# Patient Record
Sex: MALE | Race: OTHER | HISPANIC OR LATINO | URBAN - METROPOLITAN AREA
[De-identification: names, ages, dates, MRNs, and addresses within clinical notes are randomized per-mention and may not be internally consistent; named-entity substitution may affect disease eponyms.]

---

## 2017-09-06 ENCOUNTER — EMERGENCY (EMERGENCY)
Facility: HOSPITAL | Age: 42
LOS: 1 days | Discharge: HOME | End: 2017-09-06
Admitting: EMERGENCY MEDICINE
Payer: SELF-PAY

## 2017-09-06 VITALS
HEART RATE: 93 BPM | RESPIRATION RATE: 18 BRPM | TEMPERATURE: 98 F | DIASTOLIC BLOOD PRESSURE: 84 MMHG | SYSTOLIC BLOOD PRESSURE: 136 MMHG | OXYGEN SATURATION: 98 %

## 2017-09-06 PROCEDURE — 71020: CPT | Mod: 26

## 2017-09-06 PROCEDURE — 99284 EMERGENCY DEPT VISIT MOD MDM: CPT

## 2017-09-06 RX ORDER — IBUPROFEN 200 MG
600 TABLET ORAL ONCE
Qty: 0 | Refills: 0 | Status: COMPLETED | OUTPATIENT
Start: 2017-09-06 | End: 2017-09-06

## 2017-09-06 RX ADMIN — Medication 600 MILLIGRAM(S): at 21:28

## 2017-09-06 NOTE — ED PROVIDER NOTE - MEDICAL DECISION MAKING DETAILS
41 y/o M with cough with associated upper chest and back pain; likely bronchitis b/c costochondritis   -will get cxr to r/o PNA

## 2017-09-06 NOTE — ED PROVIDER NOTE - OBJECTIVE STATEMENT
43 y/o M no PMH c/o persistent dry cough x 2 weeks with associated right sided upper back and chest pain worse with cough and movement. Pt states he originally experienced pain only with cough but now experiences constant pain that worsens with cough. Also notes pt originally had rhinorrhea/congestion which has improved. Endorses mild SOB only with cough. Denies fever, chills, hemoptysis, abdominal pain, N/V/D, sore throat, HA, le edema, recent travel/sx, h/o DVT/PE, hormone use.

## 2017-09-06 NOTE — ED PROVIDER NOTE - PLAN OF CARE
Rest, stay hydrated, take all meds as previously prescribed, Followup with your PMD within 2 days for post hospital visit. Show your doctor and specialists all copies of labs given to you. Return for worsening symptoms, ex. fever, shortness of breath, chest pain, dizziness, palpitations, etc. Please read all the patient handouts.  If no PMD, can call [facility] EM/IM clinic or [facility] Medicine clinic [phone] in [facility] for appointment.

## 2017-09-06 NOTE — ED PROVIDER NOTE - CARE PLAN
Principal Discharge DX:	Bronchitis  Instructions for follow-up, activity and diet:	Rest, stay hydrated, take all meds as previously prescribed, Followup with your PMD within 2 days for post hospital visit. Show your doctor and specialists all copies of labs given to you. Return for worsening symptoms, ex. fever, shortness of breath, chest pain, dizziness, palpitations, etc. Please read all the patient handouts.  If no PMD, can call [facility] EM/IM clinic or [facility] Medicine clinic [phone] in [facility] for appointment.

## 2017-09-06 NOTE — ED PROVIDER NOTE - RESPIRATORY, MLM
Breath sounds clear and equal bilaterally. No chest wall or back TTP however pain reproducible with lateral rotation of trunk.

## 2017-10-04 ENCOUNTER — APPOINTMENT (OUTPATIENT)
Dept: PULMONOLOGY | Facility: CLINIC | Age: 42
End: 2017-10-04
Payer: SELF-PAY

## 2017-10-04 VITALS
SYSTOLIC BLOOD PRESSURE: 120 MMHG | BODY MASS INDEX: 28.51 KG/M2 | HEART RATE: 87 BPM | HEIGHT: 64 IN | TEMPERATURE: 98.7 F | OXYGEN SATURATION: 98 % | RESPIRATION RATE: 18 BRPM | WEIGHT: 167 LBS | DIASTOLIC BLOOD PRESSURE: 70 MMHG

## 2017-10-04 DIAGNOSIS — Z82.49 FAMILY HISTORY OF ISCHEMIC HEART DISEASE AND OTHER DISEASES OF THE CIRCULATORY SYSTEM: ICD-10-CM

## 2017-10-04 PROCEDURE — 99203 OFFICE O/P NEW LOW 30 MIN: CPT | Mod: 25

## 2017-10-20 ENCOUNTER — APPOINTMENT (OUTPATIENT)
Dept: PULMONOLOGY | Facility: CLINIC | Age: 42
End: 2017-10-20

## 2017-11-15 ENCOUNTER — TRANSCRIPTION ENCOUNTER (OUTPATIENT)
Age: 42
End: 2017-11-15

## 2017-11-15 ENCOUNTER — EMERGENCY (EMERGENCY)
Facility: HOSPITAL | Age: 42
LOS: 1 days | Discharge: HOME | End: 2017-11-15
Attending: EMERGENCY MEDICINE | Admitting: EMERGENCY MEDICINE
Payer: SELF-PAY

## 2017-11-15 VITALS
OXYGEN SATURATION: 98 % | TEMPERATURE: 99 F | HEART RATE: 88 BPM | DIASTOLIC BLOOD PRESSURE: 87 MMHG | SYSTOLIC BLOOD PRESSURE: 133 MMHG | RESPIRATION RATE: 20 BRPM

## 2017-11-15 PROCEDURE — 99283 EMERGENCY DEPT VISIT LOW MDM: CPT

## 2017-11-15 PROCEDURE — 71020: CPT | Mod: 26

## 2017-11-15 RX ORDER — IPRATROPIUM/ALBUTEROL SULFATE 18-103MCG
3 AEROSOL WITH ADAPTER (GRAM) INHALATION ONCE
Qty: 0 | Refills: 0 | Status: COMPLETED | OUTPATIENT
Start: 2017-11-15 | End: 2017-11-15

## 2017-11-15 RX ADMIN — Medication 3 MILLILITER(S): at 08:45

## 2017-11-15 RX ADMIN — Medication 100 MILLIGRAM(S): at 08:45

## 2017-11-15 RX ADMIN — Medication 50 MILLIGRAM(S): at 08:45

## 2017-11-15 RX ADMIN — Medication 3 MILLILITER(S): at 08:46

## 2017-11-15 NOTE — ED PROVIDER NOTE - THROAT FINDINGS
mild tonsillar swelling, no exudates NO TONGUE ELEVATION/no exudates/NO DROOLING/no redness/TONSILLAR SWELLING/NO STRIDOR

## 2017-11-15 NOTE — ED PROVIDER NOTE - PROGRESS NOTE DETAILS
Patient feeling much better. All results d/w patient and copies given with instructions to bring with them to their follow up appointment.  The patient was given verbal and written discharge instructions Specifically, instructions when to return to the ED and to seek follow-up from their pcp within 1-2 days. Any specialty follow-up was discussed, including how to make an appointment.  Instructions were discussed in simple, plain language and was understood by the patient. The patient understands that should their symptoms worsen or any new symptoms arise, they should return to the ED immediately for further evaluation.  Vss, NAD, tolerating po and ambulating steadily at discharge.

## 2017-11-15 NOTE — ED PROVIDER NOTE - MEDICAL DECISION MAKING DETAILS
41 y/o M w/ PMHx of asthma, presents to the ED c/o URI sx and wheezing. Will give nebs, steroids, cough medicine, CXR and reassess.

## 2017-11-15 NOTE — ED PROVIDER NOTE - CARE PLAN
Principal Discharge DX:	Exacerbation of asthma, unspecified asthma severity, unspecified whether persistent  Secondary Diagnosis:	Upper respiratory tract infection, unspecified type

## 2017-11-15 NOTE — ED ADULT TRIAGE NOTE - CHIEF COMPLAINT QUOTE
Pt co cough since sunday . Pt with HX of asthma co sob not relieved with inhaler. Pt is afebrile in triage.

## 2017-11-15 NOTE — ED PROVIDER NOTE - OBJECTIVE STATEMENT
41 y/o M w/ PMHx of asthma, presents to the ED c/o productive cough w/ green sputum, wheezing and SOB x4 days. Pt also reports congestion and rhinorrhea. Pt has been using his Nebulizer 4-6x a day since sx started w/ some relief, but sx return shortly. Endorses use of Albuterol inhaler w/ some relief. Positive sick contact w/ brother-in-law w/ cough 3 weeks ago. Denies fever, chills or any other complaints. NKDA. [facility]:  [address] [place] [address]. 43 y/o M w/ PMHx of asthma, presents to the ED c/o productive cough w/ green sputum, wheezing and SOB x4 days. Pt also reports congestion and rhinorrhea. Pt has been using his Nebulizer 4-6x a day since sx started w/ some relief, but sx return shortly. Endorses use of Albuterol inhaler w/ some relief. Positive sick contact w/ brother-in-law w/ cough 3 weeks ago. Denies fever, chills, cp, abd pain, vomiting, diarrhea, dysuria, or any other complaints. NKDA. [facility]:  [address] [place] [address]. No h/o intubation/admission-diagnosis of asthma recently made.

## 2017-11-15 NOTE — ED PROVIDER NOTE - RESPIRATORY WHEEZES
minimal expiratory wheezing diffusely, no accessary muscle use, speaking in full sentences, no respiratory distress/DIFFUSE

## 2019-01-20 ENCOUNTER — EMERGENCY (EMERGENCY)
Facility: HOSPITAL | Age: 44
LOS: 1 days | Discharge: HOME | End: 2019-01-20
Attending: EMERGENCY MEDICINE
Payer: SELF-PAY

## 2019-01-20 VITALS
SYSTOLIC BLOOD PRESSURE: 136 MMHG | OXYGEN SATURATION: 99 % | TEMPERATURE: 99 F | HEART RATE: 80 BPM | DIASTOLIC BLOOD PRESSURE: 84 MMHG | RESPIRATION RATE: 16 BRPM

## 2019-01-20 VITALS
SYSTOLIC BLOOD PRESSURE: 143 MMHG | HEART RATE: 93 BPM | RESPIRATION RATE: 20 BRPM | TEMPERATURE: 99 F | DIASTOLIC BLOOD PRESSURE: 93 MMHG | WEIGHT: 162.04 LBS | OXYGEN SATURATION: 99 %

## 2019-01-20 LAB
ALBUMIN SERPL ELPH-MCNC: 4.7 G/DL — SIGNIFICANT CHANGE UP (ref 3.3–5)
ALP SERPL-CCNC: 107 U/L — SIGNIFICANT CHANGE UP (ref 40–120)
ALT FLD-CCNC: 34 U/L — SIGNIFICANT CHANGE UP (ref 10–45)
ANION GAP SERPL CALC-SCNC: 14 MMOL/L — SIGNIFICANT CHANGE UP (ref 5–17)
AST SERPL-CCNC: 22 U/L — SIGNIFICANT CHANGE UP (ref 10–40)
BASOPHILS # BLD AUTO: 0.1 K/UL — SIGNIFICANT CHANGE UP (ref 0–0.2)
BASOPHILS NFR BLD AUTO: 1.1 % — SIGNIFICANT CHANGE UP (ref 0–2)
BILIRUB SERPL-MCNC: 0.6 MG/DL — SIGNIFICANT CHANGE UP (ref 0.2–1.2)
BUN SERPL-MCNC: 10 MG/DL — SIGNIFICANT CHANGE UP (ref 7–23)
CALCIUM SERPL-MCNC: 9.2 MG/DL — SIGNIFICANT CHANGE UP (ref 8.4–10.5)
CHLORIDE SERPL-SCNC: 104 MMOL/L — SIGNIFICANT CHANGE UP (ref 96–108)
CO2 SERPL-SCNC: 20 MMOL/L — LOW (ref 22–31)
CREAT SERPL-MCNC: 0.65 MG/DL — SIGNIFICANT CHANGE UP (ref 0.5–1.3)
EOSINOPHIL # BLD AUTO: 0.6 K/UL — HIGH (ref 0–0.5)
EOSINOPHIL NFR BLD AUTO: 6.9 % — HIGH (ref 0–6)
GLUCOSE SERPL-MCNC: 113 MG/DL — HIGH (ref 70–99)
HCT VFR BLD CALC: 42.6 % — SIGNIFICANT CHANGE UP (ref 39–50)
HGB BLD-MCNC: 14.8 G/DL — SIGNIFICANT CHANGE UP (ref 13–17)
LYMPHOCYTES # BLD AUTO: 2.4 K/UL — SIGNIFICANT CHANGE UP (ref 1–3.3)
LYMPHOCYTES # BLD AUTO: 28.7 % — SIGNIFICANT CHANGE UP (ref 13–44)
MCHC RBC-ENTMCNC: 27.9 PG — SIGNIFICANT CHANGE UP (ref 27–34)
MCHC RBC-ENTMCNC: 34.7 GM/DL — SIGNIFICANT CHANGE UP (ref 32–36)
MCV RBC AUTO: 80.4 FL — SIGNIFICANT CHANGE UP (ref 80–100)
MONOCYTES # BLD AUTO: 0.4 K/UL — SIGNIFICANT CHANGE UP (ref 0–0.9)
MONOCYTES NFR BLD AUTO: 5.5 % — SIGNIFICANT CHANGE UP (ref 2–14)
NEUTROPHILS # BLD AUTO: 4.8 K/UL — SIGNIFICANT CHANGE UP (ref 1.8–7.4)
NEUTROPHILS NFR BLD AUTO: 57.8 % — SIGNIFICANT CHANGE UP (ref 43–77)
PLATELET # BLD AUTO: 333 K/UL — SIGNIFICANT CHANGE UP (ref 150–400)
POTASSIUM SERPL-MCNC: 3.9 MMOL/L — SIGNIFICANT CHANGE UP (ref 3.5–5.3)
POTASSIUM SERPL-SCNC: 3.9 MMOL/L — SIGNIFICANT CHANGE UP (ref 3.5–5.3)
PROT SERPL-MCNC: 8 G/DL — SIGNIFICANT CHANGE UP (ref 6–8.3)
RBC # BLD: 5.3 M/UL — SIGNIFICANT CHANGE UP (ref 4.2–5.8)
RBC # FLD: 11.9 % — SIGNIFICANT CHANGE UP (ref 10.3–14.5)
SODIUM SERPL-SCNC: 138 MMOL/L — SIGNIFICANT CHANGE UP (ref 135–145)
WBC # BLD: 8.2 K/UL — SIGNIFICANT CHANGE UP (ref 3.8–10.5)
WBC # FLD AUTO: 8.2 K/UL — SIGNIFICANT CHANGE UP (ref 3.8–10.5)

## 2019-01-20 PROCEDURE — 85027 COMPLETE CBC AUTOMATED: CPT

## 2019-01-20 PROCEDURE — 99283 EMERGENCY DEPT VISIT LOW MDM: CPT | Mod: 25

## 2019-01-20 PROCEDURE — 94640 AIRWAY INHALATION TREATMENT: CPT

## 2019-01-20 PROCEDURE — 71046 X-RAY EXAM CHEST 2 VIEWS: CPT | Mod: 26

## 2019-01-20 PROCEDURE — 80053 COMPREHEN METABOLIC PANEL: CPT

## 2019-01-20 PROCEDURE — 99284 EMERGENCY DEPT VISIT MOD MDM: CPT

## 2019-01-20 PROCEDURE — 71046 X-RAY EXAM CHEST 2 VIEWS: CPT

## 2019-01-20 RX ORDER — ALBUTEROL 90 UG/1
0 AEROSOL, METERED ORAL
Qty: 0 | Refills: 0 | COMMUNITY

## 2019-01-20 RX ORDER — IPRATROPIUM/ALBUTEROL SULFATE 18-103MCG
3 AEROSOL WITH ADAPTER (GRAM) INHALATION ONCE
Qty: 0 | Refills: 0 | Status: COMPLETED | OUTPATIENT
Start: 2019-01-20 | End: 2019-01-20

## 2019-01-20 RX ADMIN — Medication 3 MILLILITER(S): at 09:06

## 2019-01-20 RX ADMIN — Medication 40 MILLIGRAM(S): at 09:06

## 2019-01-20 NOTE — ED PROVIDER NOTE - RESPIRATORY, MLM
no acute respiratory distress. Dry cough during exam. Reduced lung sounds in all lung fields b/l. no wheezes/rales appreciated

## 2019-01-20 NOTE — ED PROVIDER NOTE - MEDICAL DECISION MAKING DETAILS
43M  hx asthma came in c/o cold symptoms since monday with chest congestions, sob, cough, feels typical of his prior asthma exacerbations. Denies previous intubation but has had admission for asthma exacerbation. Patient has poor outpatient f/u. Will obtain basic labs, cxr, duonebs, prednisone, peak flow and reevaluate. Will likely d/c with pulm f/u. ZR

## 2019-01-20 NOTE — ED ADULT NURSE NOTE - OBJECTIVE STATEMENT
42 yo presents to the ED from home. A&Ox4, ambulatory with sister at bedside. pt c/o flu like symptoms 1/14-1/16, started feeling better, then developed difficulty breathing yesterday. pt reports pain in chest when taking a deep breath. pt has history of asthma, never intubated in the past. reports that this is his first asthma attack. pt took nebulizers x3 yesterday and inhaler with minimal relief. "it feels better for about an hour and then it comes back. pt does not appear to be in resp distress upon arrival to ED. 100% on RA. speaking in complete sentences without any difficulty. denies fever, chills. no sick contact at home. did not get flu shot. no international travel or rash noted. VSS. MD at bedside for eval. pt denies any pain at rest.

## 2019-01-20 NOTE — ED PROVIDER NOTE - OBJECTIVE STATEMENT
43M hx asthma p/w difficult breathing. Patient developed "cold-like symptoms on monday" including runny nose, cough. Subsequently worsening cough associated with chest tightness and sob. Worse when he sneezes/coughs. Tried taking home albuterol with minimal relief. States that he typically gets these symptoms at this time of year and that it feels like his typical asthma. Never intubated/hospitalized for asthma. Denies fevers, leg swelling, recent travel, non smoker, non exertional. Patient has seen pulmonologist in the past but does not follow regularly. 43M hx asthma p/w difficult breathing. Patient developed "cold-like symptoms on Monday" including runny nose, cough. Subsequently worsening cough associated with chest tightness and sob. Worse when he sneezes/coughs. Tried taking home albuterol with minimal relief. States that he typically gets these symptoms at this time of year and that it feels like his typical asthma. Never intubated/hospitalized for asthma. Denies fevers, leg swelling, recent travel, non smoker, non exertional. Patient has seen pulmonologist in the past but does not follow regularly.

## 2019-01-20 NOTE — ED PROVIDER NOTE - PROGRESS NOTE DETAILS
AG PGy2: Patient states he feels improved. Lung sounds improved. Will d/c patient home with clinic f/u and return precautions.

## 2019-01-20 NOTE — ED ADULT NURSE REASSESSMENT NOTE - NS ED NURSE REASSESS COMMENT FT1
pt completed nebulizer treatment states "that helped me a lot". pending lab and xray results. VSS. plan of care discussed. safety and comfort measures maintained.

## 2019-01-20 NOTE — ED POST DISCHARGE NOTE - ADDITIONAL DOCUMENTATION
Pharmacy called asking for verification for prescription, pt was written 20mg Prednisone take 40 tab(s) once daily for the next 4 days. Verbally told the pharmacist to change this to 2 tab(s) (40mg) once daily for the next 4 days, dispense 8 tablets. confirmed with read back. -Riaz Patrick PA-C

## 2019-01-20 NOTE — ED PROVIDER NOTE - MUSCULOSKELETAL, MLM
Spine appears normal, range of motion is not limited, no muscle or joint tenderness. 5/5 strength in distal extremities b/l. No lower leg edema appreciated.

## 2019-01-20 NOTE — ED ADULT NURSE NOTE - NSIMPLEMENTINTERV_GEN_ALL_ED
Implemented All Universal Safety Interventions:  [place] to call system. Call bell, personal items and telephone within reach. Instruct patient to call for assistance. Room bathroom lighting operational. Non-slip footwear when patient is off stretcher. Physically safe environment: no spills, clutter or unnecessary equipment. Stretcher in lowest position, wheels locked, appropriate side rails in place.

## 2019-01-20 NOTE — ED PROVIDER NOTE - NSFOLLOWUPINSTRUCTIONS_ED_ALL_ED_FT
Please follow up with a primary care doctor at [address] [place] [address] Please call [phone] to schedule an appointment. Please return to the ER if you develop worsening shortness of breath, fevers, extreme weakness, chest pain, or have any other concerns. Please take the prednisone as prescribed for the next 4 days.

## 2019-01-24 PROBLEM — J45.909 UNSPECIFIED ASTHMA, UNCOMPLICATED: Chronic | Status: ACTIVE | Noted: 2019-01-20

## 2019-02-07 ENCOUNTER — OUTPATIENT (OUTPATIENT)
Dept: OUTPATIENT SERVICES | Facility: HOSPITAL | Age: 44
LOS: 1 days | End: 2019-02-07
Payer: SELF-PAY

## 2019-02-07 ENCOUNTER — APPOINTMENT (OUTPATIENT)
Dept: INTERNAL MEDICINE | Facility: CLINIC | Age: 44
End: 2019-02-07

## 2019-02-07 VITALS
WEIGHT: 164 LBS | SYSTOLIC BLOOD PRESSURE: 138 MMHG | BODY MASS INDEX: 28 KG/M2 | HEIGHT: 64 IN | DIASTOLIC BLOOD PRESSURE: 80 MMHG

## 2019-02-07 DIAGNOSIS — I10 ESSENTIAL (PRIMARY) HYPERTENSION: ICD-10-CM

## 2019-02-07 PROCEDURE — G0463: CPT

## 2019-02-08 NOTE — PHYSICAL EXAM
[No Acute Distress] : no acute distress [Well Nourished] : well nourished [Well Developed] : well developed [Well-Appearing] : well-appearing [Normal Sclera/Conjunctiva] : normal sclera/conjunctiva [EOMI] : extraocular movements intact [Normal Outer Ear/Nose] : the outer ears and nose were normal in appearance [Normal Oropharynx] : the oropharynx was normal [Supple] : supple [No Lymphadenopathy] : no lymphadenopathy [Thyroid Normal, No Nodules] : the thyroid was normal and there were no nodules present [No Respiratory Distress] : no respiratory distress  [Clear to Auscultation] : lungs were clear to auscultation bilaterally [No Accessory Muscle Use] : no accessory muscle use [Normal Rate] : normal rate  [Regular Rhythm] : with a regular rhythm [Normal S1, S2] : normal S1 and S2 [No Murmur] : no murmur heard [No Edema] : there was no peripheral edema [No Extremity Clubbing/Cyanosis] : no extremity clubbing/cyanosis [Non Tender] : non-tender [Non-distended] : non-distended [Normal Bowel Sounds] : normal bowel sounds [Normal Posterior Cervical Nodes] : no posterior cervical lymphadenopathy [Normal Anterior Cervical Nodes] : no anterior cervical lymphadenopathy [No CVA Tenderness] : no CVA  tenderness [No Spinal Tenderness] : no spinal tenderness [No Joint Swelling] : no joint swelling [Grossly Normal Strength/Tone] : grossly normal strength/tone [No Rash] : no rash [Coordination Grossly Intact] : coordination grossly intact [No Focal Deficits] : no focal deficits [Normal Affect] : the affect was normal [Normal Insight/Judgement] : insight and judgment were intact [de-identified] : Tendernes to palpation of R. side of back just above tip of scapula  [de-identified] : full range of motion of upper and lower extremities, 5/5 strength

## 2019-02-08 NOTE — HISTORY OF PRESENT ILLNESS
[FreeTextEntry2] : Patient is a 43 y.o M w/ a hx of asthma who presents for a follow up post ED. visit.  Patient presented to the ED two weeks ago for SOB, wheezing, and chest tightness. He states that albuterol helped for an hour but his symptoms recurred and persisted. In the ED, he was given nebulizers and his symptoms resolved. His x ray revealed clear lungs. He reports that this is his first asthma exacerbation in the past year. In a span of a week, he uses his albuterol about 0-1x/week, depending on the weather. He denies night time awakenings. He denies any different environmental exposures. He also denies any recent sick contacts. About a month ago, patient reports intermittent mild SOB and green sputum production (without cough). He states that he also got a dog about a month ago and he finds that his symptoms can be exacerbated when the dog is in the same room.  \par \par Of note, patient saw Pulmonology in october 2017 who found that his intermittent SOB and cough is relieved w/ bronchodilators which could be suggestive of asthma. Patient has not undergone a pulmonary function tests as he does not have insurance. He states that his insurance will start on March 1st and will do a pulmonary function test then. \par \par Also of note, patient reports a sharp, constant pain at the right side of his back that started three weeks ago [place] train. He stated that it is relieved w/ alleve, when he leans to the left side, and when he gets a massage. He denies any recent trauma.

## 2019-02-08 NOTE — PLAN
[FreeTextEntry1] : Patient is a 43 y.o M w/ PMH asthma who presents for a post ED discharge visit for acute asthma exacerbation. Patient also notes right sided back pain 2/2 msk strain. \par \par #Asthma \par -Patient's asthma appears to be well controlled as he only had one asthma exacerbation in the past year, uses his albuterol 0-1x/week, and no night time awakenings\par -Patient advised to use albuterol as needed\par -Appears to have symtpoms associated w/ his new dog that he got a month ago. Advised patient to limit exposure to dog and if interferes w/ daily activities, consider having the dog live apart from him. Expressed undrestanding\par -Patient will be getting insurance on March 1st, 2019. Will get PFT after his insurance is established\par -Gave pulm referral \par \par #MSk strain \par -Patient's symptoms 2/2 msk strain as relieved w/ massage, alleve, and certain positions. \par -Patient advised to control symptoms w/ conservative management w/ alleve and heat/cold packs. Patient expressed understanding \par -If symptoms persist or worsen, patient advised to call back to clinic \par \par Will RTC in 10 weeks for CPE \par \par D/W Dr. Balderas

## 2019-02-08 NOTE — REVIEW OF SYSTEMS
[Muscle Pain] : muscle pain [Negative] : Heme/Lymph [Fever] : no fever [Chills] : no chills [Muscle Weakness] : no muscle weakness

## 2019-02-21 DIAGNOSIS — J45.909 UNSPECIFIED ASTHMA, UNCOMPLICATED: ICD-10-CM

## 2019-03-10 ENCOUNTER — TRANSCRIPTION ENCOUNTER (OUTPATIENT)
Age: 44
End: 2019-03-10

## 2019-03-19 ENCOUNTER — APPOINTMENT (OUTPATIENT)
Dept: PULMONOLOGY | Facility: CLINIC | Age: 44
End: 2019-03-19
Payer: COMMERCIAL

## 2019-03-19 VITALS
TEMPERATURE: 97.4 F | DIASTOLIC BLOOD PRESSURE: 89 MMHG | HEIGHT: 63.78 IN | OXYGEN SATURATION: 97 % | RESPIRATION RATE: 16 BRPM | SYSTOLIC BLOOD PRESSURE: 135 MMHG | BODY MASS INDEX: 27.83 KG/M2 | WEIGHT: 161 LBS | HEART RATE: 74 BPM

## 2019-03-19 DIAGNOSIS — Z82.5 FAMILY HISTORY OF ASTHMA AND OTHER CHRONIC LOWER RESPIRATORY DISEASES: ICD-10-CM

## 2019-03-19 DIAGNOSIS — Z00.00 ENCOUNTER FOR GENERAL ADULT MEDICAL EXAMINATION W/OUT ABNORMAL FINDINGS: ICD-10-CM

## 2019-03-19 PROCEDURE — 94060 EVALUATION OF WHEEZING: CPT

## 2019-03-19 PROCEDURE — 99214 OFFICE O/P EST MOD 30 MIN: CPT | Mod: 25

## 2019-03-19 PROCEDURE — 94729 DIFFUSING CAPACITY: CPT

## 2019-03-19 PROCEDURE — 94726 PLETHYSMOGRAPHY LUNG VOLUMES: CPT

## 2019-03-19 PROCEDURE — ZZZZZ: CPT

## 2019-03-19 NOTE — PHYSICAL EXAM
[General Appearance - Well Developed] : well developed [General Appearance - Well Nourished] : well nourished [Normal Conjunctiva] : the conjunctiva exhibited no abnormalities [Neck Appearance] : the appearance of the neck was normal [Jugular Venous Distention Increased] : there was no jugular-venous distention [Heart Rate And Rhythm] : heart rate and rhythm were normal [Heart Sounds] : normal S1 and S2 [Murmurs] : no murmurs present [Exaggerated Use Of Accessory Muscles For Inspiration] : no accessory muscle use [Auscultation Breath Sounds / Voice Sounds] : lungs were clear to auscultation bilaterally [Bowel Sounds] : normal bowel sounds [Abdomen Soft] : soft [Abdomen Tenderness] : non-tender [Abnormal Walk] : normal gait [Nail Clubbing] : no clubbing of the fingernails [Cyanosis, Localized] : no localized cyanosis [Skin Turgor] : normal skin turgor [] : no rash [Motor Exam] : the motor exam was normal [No Focal Deficits] : no focal deficits [Oriented To Time, Place, And Person] : oriented to person, place, and time [Affect] : the affect was normal

## 2019-03-20 PROBLEM — Z00.00 ENCOUNTER FOR PREVENTIVE HEALTH EXAMINATION: Status: ACTIVE | Noted: 2017-09-14

## 2019-03-20 NOTE — ASSESSMENT
[FreeTextEntry1] : 44M hx no significant medical issues, who comes for asthma evaluation. PFTs normal. Reports symptoms only in cold (allergies also a trigger) and rarely requires albuterol use during the winter. Over the past year has only had 2 episodes of acute exacerbation requiring prednisone (last in 2/2019 in setting of URI). Likely has mild, intermittent asthma based on symptoms. \par - cont albuterol prn\par - may start flovent during winter to prevent exacerbations\par - avoid triggers\par - flu vaccine given today\par \par Case d/w Dr. North\par

## 2019-03-20 NOTE — HISTORY OF PRESENT ILLNESS
[FreeTextEntry1] : 44M hx no significant medical issues, who comes for initial evaluation. Last seen by Dr. Juarez in 2017 for asthma evaluation and was started on proair, ordered for PFTs but never went. Pt recently had ED visit on 1/20/2019 for difficulty breathing likely triggered by URI and given prednisone as well as albuterol. He has insurance now and went to f/u with PMD after ED visit on 2/7 and was given a referral to pulmonary. \par \par States that he has had asthma since 2013. Triggers include cold weather, allergies to dust/sneezing. Rarely gets exacerbations and in past year has had to have 2 courses of steroids. Never hospitalized/intubated. Uses albuterol inhaler up to twice a week (this past week has not used it). No nighttime awakenings because of dyspnea. Never had PFTs prior to today. Only feels dyspneic during winter. Originally from [facility] and moved here 27 years ago. Works as . No occupational exposures. No wood burning stoves. Does not smoke. Has family history of asthma (son, sister). Currently only takes albuterol. Otherwise denies fevers, chills, chest pain, SOB, abdominal pain, nausea/vomiting, wheezing, coughing, b/l LE edema, dizziness.\par \par PFT: FEV1/FVC 82%, FEV1 84%, no bronchodilatory respnose, TLC 84%, DLCO 85% (normal PFTs)\par CXR 1/20/2019: clear lungs

## 2019-04-17 ENCOUNTER — LABORATORY RESULT (OUTPATIENT)
Age: 44
End: 2019-04-17

## 2019-04-18 ENCOUNTER — APPOINTMENT (OUTPATIENT)
Dept: INTERNAL MEDICINE | Facility: CLINIC | Age: 44
End: 2019-04-18
Payer: COMMERCIAL

## 2019-04-18 ENCOUNTER — MED ADMIN CHARGE (OUTPATIENT)
Age: 44
End: 2019-04-18

## 2019-04-18 ENCOUNTER — OUTPATIENT (OUTPATIENT)
Dept: OUTPATIENT SERVICES | Facility: HOSPITAL | Age: 44
LOS: 1 days | End: 2019-04-18
Payer: COMMERCIAL

## 2019-04-18 VITALS
SYSTOLIC BLOOD PRESSURE: 114 MMHG | DIASTOLIC BLOOD PRESSURE: 80 MMHG | OXYGEN SATURATION: 98 % | WEIGHT: 159 LBS | BODY MASS INDEX: 27.48 KG/M2 | HEART RATE: 85 BPM

## 2019-04-18 DIAGNOSIS — I10 ESSENTIAL (PRIMARY) HYPERTENSION: ICD-10-CM

## 2019-04-18 PROCEDURE — 87389 HIV-1 AG W/HIV-1&-2 AB AG IA: CPT

## 2019-04-18 PROCEDURE — G0463: CPT

## 2019-04-18 PROCEDURE — 90732 PPSV23 VACC 2 YRS+ SUBQ/IM: CPT

## 2019-04-18 PROCEDURE — 80053 COMPREHEN METABOLIC PANEL: CPT

## 2019-04-18 PROCEDURE — 90715 TDAP VACCINE 7 YRS/> IM: CPT

## 2019-04-18 PROCEDURE — 36415 COLL VENOUS BLD VENIPUNCTURE: CPT

## 2019-04-18 PROCEDURE — G0009: CPT

## 2019-04-18 PROCEDURE — 83036 HEMOGLOBIN GLYCOSYLATED A1C: CPT

## 2019-04-18 PROCEDURE — 90471 IMMUNIZATION ADMIN: CPT

## 2019-04-18 PROCEDURE — 99213 OFFICE O/P EST LOW 20 MIN: CPT | Mod: GE

## 2019-04-18 NOTE — HISTORY OF PRESENT ILLNESS
[FreeTextEntry1] : CPE [de-identified] : The patient is a 44 year old male with a PMH of asthma (triggered by cold and dust) who presents to clinic for CPE. He has no medical complaints. His asthma is not bothering him given that it is no longer cold out. He went to ED in 12/18 for a nebulizer treatment. He saw pulm March and had normal PFTs. He has no other medical problems. He takes no medications. He works as a  full time in the city[place] [employer]. Very active at work. Eats a healthy diet and gets most of his exercise as work because he is on his feet all day and runs up and down the stairs. Lives at home with parents.  with children. Not sexually active no hx of multiple sex partners in the past. Amenable to HIV testing today. No drugs, alcohol, or tobacco use. Depression screen negative. Amenable to PSV 23 and TDAP today. [place] during [place]. Very pleasant gentleman.

## 2019-04-18 NOTE — ASSESSMENT
[FreeTextEntry1] : 44 year old healthy male with hx of asthma (triggered by cold weather and dust, normal PFTs 03/19, follows with pulm, most recent ED visit 12/18, does not use rescue inhaler regularly), who presents to clinic for CPE.\par \par Problem: asthma\par Assessment: Triggered by the cold during winter and dust. Had normal PFTs 03/19, follows with pulm, most recent ED visit 12/18, does not use rescue inhaler regularly\par Plan: Continue to monitor. Flu vaccine in 09/19. PSV23 given today.\par \par HCM:\par HIV, CMP, Hba1c today\par Depression screen negative\par PSV 23 and TDAP given today\par RTC in 09/19 for flu vaccine

## 2019-04-18 NOTE — PHYSICAL EXAM
[No Acute Distress] : no acute distress [Well Nourished] : well nourished [Well Developed] : well developed [Well-Appearing] : well-appearing [Normal Sclera/Conjunctiva] : normal sclera/conjunctiva [Normal Outer Ear/Nose] : the outer ears and nose were normal in appearance [No JVD] : no jugular venous distention [No Respiratory Distress] : no respiratory distress  [Clear to Auscultation] : lungs were clear to auscultation bilaterally [No Accessory Muscle Use] : no accessory muscle use [Normal Rate] : normal rate  [Regular Rhythm] : with a regular rhythm [Normal S1, S2] : normal S1 and S2 [No Murmur] : no murmur heard [No Edema] : there was no peripheral edema [Soft] : abdomen soft [Non Tender] : non-tender [Non-distended] : non-distended [No Joint Swelling] : no joint swelling [No Rash] : no rash [Normal Gait] : normal gait [Normal Affect] : the affect was normal [Normal Insight/Judgement] : insight and judgment were intact

## 2019-04-18 NOTE — REVIEW OF SYSTEMS
[Fever] : no fever [Earache] : no earache [Discharge] : no discharge [Chest Pain] : no chest pain [Shortness Of Breath] : no shortness of breath [Abdominal Pain] : no abdominal pain [Dysuria] : no dysuria [Joint Pain] : no joint pain [Itching] : no itching [Headache] : no headache [Depression] : no depression

## 2019-04-19 LAB
ALBUMIN SERPL ELPH-MCNC: 5 G/DL — SIGNIFICANT CHANGE UP (ref 3.3–5)
ALP SERPL-CCNC: 104 U/L — SIGNIFICANT CHANGE UP (ref 40–120)
ALT FLD-CCNC: 33 U/L — SIGNIFICANT CHANGE UP (ref 10–45)
ANION GAP SERPL CALC-SCNC: 12 MMOL/L — SIGNIFICANT CHANGE UP (ref 5–17)
AST SERPL-CCNC: 23 U/L — SIGNIFICANT CHANGE UP (ref 10–40)
BILIRUB SERPL-MCNC: 1 MG/DL — SIGNIFICANT CHANGE UP (ref 0.2–1.2)
BUN SERPL-MCNC: 10 MG/DL — SIGNIFICANT CHANGE UP (ref 7–23)
CALCIUM SERPL-MCNC: 9.7 MG/DL — SIGNIFICANT CHANGE UP (ref 8.4–10.5)
CHLORIDE SERPL-SCNC: 101 MMOL/L — SIGNIFICANT CHANGE UP (ref 96–108)
CO2 SERPL-SCNC: 27 MMOL/L — SIGNIFICANT CHANGE UP (ref 22–31)
CREAT SERPL-MCNC: 0.73 MG/DL — SIGNIFICANT CHANGE UP (ref 0.5–1.3)
ESTIMATED AVERAGE GLUCOSE: 120 MG/DL — HIGH (ref 68–114)
GLUCOSE SERPL-MCNC: 102 MG/DL — HIGH (ref 70–99)
HBA1C BLD-MCNC: 5.8 % — HIGH (ref 4–5.6)
HIV 1+2 AB+HIV1 P24 AG SERPL QL IA: SIGNIFICANT CHANGE UP
POTASSIUM SERPL-MCNC: 4.7 MMOL/L — SIGNIFICANT CHANGE UP (ref 3.5–5.3)
POTASSIUM SERPL-SCNC: 4.7 MMOL/L — SIGNIFICANT CHANGE UP (ref 3.5–5.3)
PROT SERPL-MCNC: 8.3 G/DL — SIGNIFICANT CHANGE UP (ref 6–8.3)
SODIUM SERPL-SCNC: 140 MMOL/L — SIGNIFICANT CHANGE UP (ref 135–145)

## 2019-04-22 DIAGNOSIS — J45.909 UNSPECIFIED ASTHMA, UNCOMPLICATED: ICD-10-CM

## 2019-05-09 DIAGNOSIS — R73.03 PREDIABETES.: ICD-10-CM

## 2019-05-29 ENCOUNTER — TRANSCRIPTION ENCOUNTER (OUTPATIENT)
Age: 44
End: 2019-05-29

## 2019-06-20 ENCOUNTER — APPOINTMENT (OUTPATIENT)
Dept: INTERNAL MEDICINE | Facility: CLINIC | Age: 44
End: 2019-06-20

## 2019-07-10 ENCOUNTER — TRANSCRIPTION ENCOUNTER (OUTPATIENT)
Age: 44
End: 2019-07-10

## 2019-07-19 ENCOUNTER — RX RENEWAL (OUTPATIENT)
Age: 44
End: 2019-07-19

## 2019-07-22 ENCOUNTER — RX RENEWAL (OUTPATIENT)
Age: 44
End: 2019-07-22

## 2019-09-03 ENCOUNTER — RX RENEWAL (OUTPATIENT)
Age: 44
End: 2019-09-03

## 2020-01-03 ENCOUNTER — TRANSCRIPTION ENCOUNTER (OUTPATIENT)
Age: 45
End: 2020-01-03

## 2020-01-07 ENCOUNTER — OUTPATIENT (OUTPATIENT)
Dept: OUTPATIENT SERVICES | Facility: HOSPITAL | Age: 45
LOS: 1 days | End: 2020-01-07
Payer: COMMERCIAL

## 2020-01-07 ENCOUNTER — APPOINTMENT (OUTPATIENT)
Dept: INTERNAL MEDICINE | Facility: CLINIC | Age: 45
End: 2020-01-07

## 2020-01-07 VITALS
TEMPERATURE: 99.1 F | DIASTOLIC BLOOD PRESSURE: 80 MMHG | SYSTOLIC BLOOD PRESSURE: 120 MMHG | WEIGHT: 159 LBS | HEIGHT: 63 IN | BODY MASS INDEX: 28.17 KG/M2

## 2020-01-07 DIAGNOSIS — I10 ESSENTIAL (PRIMARY) HYPERTENSION: ICD-10-CM

## 2020-01-07 PROCEDURE — G0463: CPT

## 2020-01-07 RX ORDER — BENZONATATE 100 MG/1
100 CAPSULE ORAL
Qty: 42 | Refills: 0 | Status: COMPLETED | COMMUNITY
Start: 2020-01-07 | End: 2020-01-14

## 2020-01-07 RX ORDER — ALBUTEROL SULFATE 90 UG/1
108 (90 BASE) AEROSOL, METERED RESPIRATORY (INHALATION) EVERY 6 HOURS
Qty: 1 | Refills: 1 | Status: DISCONTINUED | COMMUNITY
Start: 2017-10-04 | End: 2020-01-07

## 2020-01-07 RX ORDER — ALBUTEROL SULFATE 90 UG/1
108 (90 BASE) AEROSOL, METERED RESPIRATORY (INHALATION) EVERY 6 HOURS
Qty: 1 | Refills: 2 | Status: ACTIVE | COMMUNITY
Start: 2017-10-04 | End: 1900-01-01

## 2020-01-08 NOTE — REVIEW OF SYSTEMS
[Fatigue] : fatigue [Sore Throat] : sore throat [Cough] : cough [Fever] : no fever [Night Sweats] : no night sweats [Chills] : no chills [Pain] : no pain [Redness] : no redness [Vision Problems] : no vision problems [Earache] : no earache [Postnasal Drip] : no postnasal drip [Hoarseness] : no hoarseness [Shortness Of Breath] : no shortness of breath [Chest Pain] : no chest pain [Dysuria] : no dysuria [Frequency] : no frequency [FreeTextEntry4] : Reports occasional runny nose when he coughs [FreeTextEntry6] : Wheezing requiring using his nebulizer

## 2020-01-08 NOTE — PHYSICAL EXAM
[Normal Sclera/Conjunctiva] : normal sclera/conjunctiva [No Acute Distress] : no acute distress [Normal Outer Ear/Nose] : the outer ears and nose were normal in appearance [No Lymphadenopathy] : no lymphadenopathy [Normal Oropharynx] : the oropharynx was normal [Normal Nasal Mucosa] : the nasal mucosa was normal [Supple] : supple [No Respiratory Distress] : no respiratory distress  [Clear to Auscultation] : lungs were clear to auscultation bilaterally [Normal Rate] : normal rate  [No Accessory Muscle Use] : no accessory muscle use [Regular Rhythm] : with a regular rhythm [Normal S1, S2] : normal S1 and S2 [No Murmur] : no murmur heard [de-identified] : constantly coughing

## 2020-01-08 NOTE — ASSESSMENT
[FreeTextEntry1] : 44M h/o asthma (triggered by cold weather and dust, normal PFTs 03/19, follows with pulm, most recent ED visit 12/18, does not use rescue inhaler regularly) presents to the office for post-viral cough syndrome.

## 2020-01-08 NOTE — HISTORY OF PRESENT ILLNESS
[FreeTextEntry8] : 44M h/o asthma (triggered by cold weather and dust, normal PFTs 03/19, follows with pulm, most recent ED visit 12/18, does not use rescue inhaler regularly) presents to the office for persistent cough. He tested positive for flu about three weeks ago at Urgent Care. Pt was able to control his cough with guaifenesin and dextromethorphan initially, but he has still been having a dry cough for the past 2 weeks despite continuing the medications. He started to use his albuterol nebulizer because the cough was worsening, and had moderate relief. Pt also reports a sore throat and has been using cough drops regularly.  Denies fever, chills, shortness of breath, chest pain, headache, and nasal congestion.

## 2020-01-10 ENCOUNTER — APPOINTMENT (OUTPATIENT)
Dept: INTERNAL MEDICINE | Facility: CLINIC | Age: 45
End: 2020-01-10

## 2020-01-14 DIAGNOSIS — J45.909 UNSPECIFIED ASTHMA, UNCOMPLICATED: ICD-10-CM

## 2020-01-14 DIAGNOSIS — R05 COUGH: ICD-10-CM

## 2020-02-25 RX ORDER — ALBUTEROL SULFATE 2.5 MG/3ML
(2.5 MG/3ML) SOLUTION RESPIRATORY (INHALATION)
Qty: 1 | Refills: 3 | Status: ACTIVE | COMMUNITY
Start: 2019-03-19 | End: 1900-01-01

## 2020-05-06 ENCOUNTER — TRANSCRIPTION ENCOUNTER (OUTPATIENT)
Age: 45
End: 2020-05-06

## 2020-07-11 ENCOUNTER — TRANSCRIPTION ENCOUNTER (OUTPATIENT)
Age: 45
End: 2020-07-11

## 2020-11-21 ENCOUNTER — TRANSCRIPTION ENCOUNTER (OUTPATIENT)
Age: 45
End: 2020-11-21

## 2020-11-23 ENCOUNTER — NON-APPOINTMENT (OUTPATIENT)
Age: 45
End: 2020-11-23

## 2020-11-23 NOTE — DISCUSSION/SUMMARY
[Specialty: _____] : Specialty: [unfilled] [FreeTextEntry1] : Tried to call patient at listed phone number [phone], but number not in service. [facility] urgent care note reviewed. Pt went to urgent care on 11/21 for general concern of COVID-19 without specific personal exposure. Had denied fevers, cough, or any other concerning symptoms. Received COVID-19 PCR swab and flu vaccine. Unable to leave message for pt as phone number was not in service and no other phone number could be found in chart.

## 2020-12-17 ENCOUNTER — APPOINTMENT (OUTPATIENT)
Dept: INTERNAL MEDICINE | Facility: CLINIC | Age: 45
End: 2020-12-17

## 2021-02-06 ENCOUNTER — TRANSCRIPTION ENCOUNTER (OUTPATIENT)
Age: 46
End: 2021-02-06

## 2021-02-09 ENCOUNTER — APPOINTMENT (OUTPATIENT)
Dept: PULMONOLOGY | Facility: CLINIC | Age: 46
End: 2021-02-09
Payer: COMMERCIAL

## 2021-02-09 ENCOUNTER — LABORATORY RESULT (OUTPATIENT)
Age: 46
End: 2021-02-09

## 2021-02-09 ENCOUNTER — NON-APPOINTMENT (OUTPATIENT)
Age: 46
End: 2021-02-09

## 2021-02-09 VITALS
DIASTOLIC BLOOD PRESSURE: 80 MMHG | HEART RATE: 89 BPM | SYSTOLIC BLOOD PRESSURE: 120 MMHG | WEIGHT: 174 LBS | HEIGHT: 63 IN | TEMPERATURE: 96.5 F | BODY MASS INDEX: 30.83 KG/M2 | OXYGEN SATURATION: 98 % | RESPIRATION RATE: 16 BRPM

## 2021-02-09 DIAGNOSIS — Z87.19 PERSONAL HISTORY OF OTHER DISEASES OF THE DIGESTIVE SYSTEM: ICD-10-CM

## 2021-02-09 DIAGNOSIS — U07.1 COVID-19: ICD-10-CM

## 2021-02-09 DIAGNOSIS — Z83.3 FAMILY HISTORY OF DIABETES MELLITUS: ICD-10-CM

## 2021-02-09 DIAGNOSIS — Z82.49 FAMILY HISTORY OF ISCHEMIC HEART DISEASE AND OTHER DISEASES OF THE CIRCULATORY SYSTEM: ICD-10-CM

## 2021-02-09 DIAGNOSIS — Z86.19 PERSONAL HISTORY OF OTHER INFECTIOUS AND PARASITIC DISEASES: ICD-10-CM

## 2021-02-09 DIAGNOSIS — R05 COUGH: ICD-10-CM

## 2021-02-09 LAB
24R-OH-CALCIDIOL SERPL-MCNC: 98.6 PG/ML
25(OH)D3 SERPL-MCNC: 15.2 NG/ML
BASOPHILS # BLD AUTO: 0.02 K/UL
BASOPHILS NFR BLD AUTO: 0.1 %
EOSINOPHIL # BLD AUTO: 0 K/UL
EOSINOPHIL NFR BLD AUTO: 0 %
HCT VFR BLD CALC: 45 %
HGB BLD-MCNC: 14.8 G/DL
IMM GRANULOCYTES NFR BLD AUTO: 0.9 %
LYMPHOCYTES # BLD AUTO: 1.76 K/UL
LYMPHOCYTES NFR BLD AUTO: 10.9 %
MAN DIFF?: NORMAL
MCHC RBC-ENTMCNC: 27.2 PG
MCHC RBC-ENTMCNC: 32.9 GM/DL
MCV RBC AUTO: 82.7 FL
MONOCYTES # BLD AUTO: 0.36 K/UL
MONOCYTES NFR BLD AUTO: 2.2 %
NEUTROPHILS # BLD AUTO: 13.93 K/UL
NEUTROPHILS NFR BLD AUTO: 85.9 %
PLATELET # BLD AUTO: 388 K/UL
RBC # BLD: 5.44 M/UL
RBC # FLD: 13.7 %
WBC # FLD AUTO: 16.21 K/UL

## 2021-02-09 PROCEDURE — 99204 OFFICE O/P NEW MOD 45 MIN: CPT | Mod: 25

## 2021-02-09 PROCEDURE — 94618 PULMONARY STRESS TESTING: CPT

## 2021-02-09 PROCEDURE — 71046 X-RAY EXAM CHEST 2 VIEWS: CPT

## 2021-02-09 PROCEDURE — 99072 ADDL SUPL MATRL&STAF TM PHE: CPT

## 2021-02-09 PROCEDURE — 94010 BREATHING CAPACITY TEST: CPT

## 2021-02-09 PROCEDURE — 95012 NITRIC OXIDE EXP GAS DETER: CPT

## 2021-02-09 RX ORDER — HYDROCORTISONE 25 MG/G
2.5 CREAM TOPICAL
Qty: 1 | Refills: 0 | Status: DISCONTINUED | COMMUNITY
Start: 2019-05-13 | End: 2021-02-09

## 2021-02-09 NOTE — PHYSICAL EXAM
[No Acute Distress] : no acute distress [III] : Mallampati Class: III [Normal Oropharynx] : normal oropharynx [Normal Appearance] : normal appearance [No Neck Mass] : no neck mass [Normal Rate/Rhythm] : normal rate/rhythm [Normal S1, S2] : normal s1, s2 [No Murmurs] : no murmurs [No Resp Distress] : no resp distress [Clear to Auscultation Bilaterally] : clear to auscultation bilaterally [No Abnormalities] : no abnormalities [Benign] : benign [Normal Gait] : normal gait [No Clubbing] : no clubbing [No Cyanosis] : no cyanosis [No Edema] : no edema [FROM] : FROM [Normal Color/ Pigmentation] : normal color/ pigmentation [No Focal Deficits] : no focal deficits [Oriented x3] : oriented x3 [Normal Affect] : normal affect [TextBox_2] : OW [TextBox_68] : I:E ratio 1:3; clear

## 2021-02-09 NOTE — HISTORY OF PRESENT ILLNESS
[TextBox_4] : Mr. RAMIREZ is a 45 year old male presenting to the office today for initial pulmonary evaluation. His chief complaint is Asthma sx. \par -he is currently taking ABx and steroids.\par -asthma has been present for the last couple of years. \par -his Asthma Sx include SOB, coughing, chest tightness. \par -he states that he sometimes needs to use the nebulizer. \par -the cold air causes his asthma to become active. \par -URI can cause the asthma Sx. \par -he states that he does not exercise but walks a lot. \par -he states that he was never hospitalized for Asthma. \par -Asthma acts up in the winter season. \par -reports allergies to dust. \par -allergy Sx include sneezing. \par -allergy is all year around. \par -allergy is not worse a specific time in a year. \par -no food allergies. \par -reports heartburn; occurs every week. \par -energy level is good. \par -snoring is not that bad. He snores when he is very tired. \par -neck size is 16. \par - could fall asleep while watching a boring TV show \par - could fall asleep in a car \par -memory and concentration is good. 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 \par -recently he was positive for COVID-19. \par -reports a residual cough. \par -he is feeling better since his Abx treatment from Urgent Care (last week). \par \par -he denies any chest pain, chest pressure, diarrhea, constipation, dysphagia, dizziness, leg swelling, leg pain, itchy eyes, itchy ears.

## 2021-02-09 NOTE — ASSESSMENT
[FreeTextEntry1] : Mr. RAMIREZ is a 45 year old male with a history of nonsmoker, chicken pox, measles, Pre-Diabetes, Asthma, likely Allergies, COVID-19 infection who comes into the office today for pulmonary evaluation for s/p COVID-19, SOB, Asthma. \par \par The patient's shortness of breath is multifactorial due to:\par -pulmonary disease \par      - Asthma \par      -s/p COVID-19 infection\par -poor breathing mechanics \par -overweight/out of shape\par -?cardiac disease - no evidence\par \par \par Problem 1: Asthma \par -Add Trelegy 200 1 inhalation QD \par -Albuterol (0.83) by nebulizer QID  (gargle and rinse after use) \par \par -Asthma is believed to be caused by inherited (genetic) and environmental factor, but its exact cause is unknown. Asthma may be triggered by allergens, lung infections, or irritants in the air. Asthma triggers are different for each person \par -Inhaler technique reviewed as well as oral hygiene techniques reviewed with patient. Avoidance of cold air, extremes of temperature, rescue inhaler should be used before exercise. Order of medication reviewed with patient. Recommended use of a cool mist humidifier in the bedroom.\par \par Problem 2: s/p COVID-19 infection (radiographically clear) \par -completing Steroids \par -no vaccination until May 2021. \par \par Health Maintenance/COVID19 Precautions:\par - Clean your hands often. Wash your hands often with soap and water for at least 20 seconds, especially after blowing your nose, coughing, or sneezing, or having been in a public place.\par - If soap and water are not available, use a hand  that contains at least 60% alcohol.\par - To the extent possible, avoid touching high-touch surfaces in public places - elevator buttons, door handles, handrails, handshaking with people, etc. Use a tissue or your sleeve to cover your hand or finger if you must touch something.\par - Wash your hands after touching surfaces in public places.\par - Avoid touching your face, nose, eyes, etc.\par - Clean and disinfect your home to remove germs: practice routine cleaning of frequently touched surfaces (for example: tables, doorknobs, light switches, handles, desks, toilets, faucets, sinks & cell phones)\par - Avoid crowds, especially in poorly ventilated spaces. Your risk of exposure to respiratory viruses like COVID-19 may increase in crowded, closed-in settings with little air circulation if there are people in the crowd who are sick. All patients are recommended to practice social distancing and stay at least 6 feet away from others.\par - Avoid all non-essential travel including plane trips, and especially avoid embarking on cruise ships.\par -If COVID-19 is spreading in your community, take extra measures to put distance between yourself and other people to further reduce your risk of being exposed to this new virus.\par -Stay home as much as possible.\par - Consider ways of getting food brought to your house through family, social, or commercial networks\par -Be aware that the virus has been known to live in the air up to 3 hours post exposure, cardboard up to 24 hours post exposure, copper up to 4 hours post exposure, steel and plastic up to 2-3 days post exposure. Risk of transmission from these surfaces are affected by many variables.\par \par Immune Support Recommendations:\par -OTC Vitamin C 500mg BID \par -OTC Quercetin 250-500mg BID \par -OTC Zinc 75-100mg per day \par -OTC Melatonin 1 or 2 mg a night \par -OTC Vitamin D 1-4000mg per day \par -OTC Tonic Water 8oz per day\par \par Asthma and COVID19:\par You need to make sure your asthma is under control. This often requires the use of inhaled corticosteroids (and sometimes oral corticosteroids). Inhaled corticosteroids do not likely reduce your immune system’s ability to fight infections, but oral corticosteroids may. It is important to use the steps above to protect yourself to limit your exposure to any respiratory virus. \par \par Problem 3: Allergies\par -Complete blood work to include: IgE level, eosinophil level, vitamin D level, food IgE level, and asthma profile \par -Add Olopatadine 0.6% at 1 sniff/nostril BID \par \par Environmental measures for allergies were encouraged including mattress and pillow cover, air purifier, and environmental controls. \par \par Problem 4: GERD\par -OTC Pepcid - if needed \par -Rule of 2s: avoid eating too much, eating too fast, eating too late, eating too spicy, eating too lousy, eating two hours before bed.\par -Things to avoid including overeating, spicy foods, tight clothing, eating within three hours of bed, this list is not all inclusive. \par -For treatment of reflux, possible options discussed including diet control, H2 blockers, PPIs, as well as coating motility agents discussed as treatment options. Timing of meals and proximity of last meal to sleep were discussed. If symptoms persist, a formal gastrointestinal evaluation is needed. \par \par Problem 5: ?SAMY (elevated Mallampati Class, neck size, nocturia) \par -Complete Home Sleep Study \par Sleep apnea is associated with adverse clinical consequences which an affect most organ systems. Cardiovascular disease risk includes arrhythmias, atrial fibrillation, hypertension, coronary artery disease, and stroke. Metabolic disorders include diabetes type 2, non-alcoholic fatty liver disease. Mood disorder especially depression; and cognitive decline especially in the elderly. Associations with chronic reflux/Pike’s esophagus some but not all inclusive. \par -Reasons include arousal consistent with hypopnea; respiratory events most prominent in REM sleep or supine position; therefore sleep staging and body position are important for accurate diagnosis and estimation of AHI. \par \par Problem 6: Poor Mechanics of Breathing\par - Proper breathing techniques were reviewed with an emphasis of exhalation. Patient instructed to breath in for 1 second and out for four seconds. Patient was encouraged to not talk while walking. \par \par Problem 7: Overweight\par -Weight loss, exercise, and diet control were discussed and are highly encouraged. Treatment options were given such as, aqua therapy, and contacting a nutritionist. Recommended to use the elliptical, stationary bike, less use of treadmill. Mindful eating was explained to the patient Obesity is associated with worsening asthma, shortness of breath, and potential for cardiac disease, diabetes, and other underlying medical conditions. \par \par Problem 8: health maintenance\par -recommended to stay hydrated and avoid fluids 3 hours before bed.\par -?s/p influenza vaccine 2020\par -recommended strep pneumonia vaccines after age 65: Prevnar-13 vaccine, followed by Pneumo vaccine 23 one year following\par -recommended early intervention for URIs\par -recommended regular osteoporosis evaluations\par -recommended early dermatological evaluations\par -recommended after the age of 50 to the age of 70, colonoscopy every 5 years \par \par  Follow up in 6-8 weeks\par -he  is recommended to call with any changes, questions, or concerns.

## 2021-02-09 NOTE — PROCEDURE
[FreeTextEntry1] : CXR reveals a normal sized heart; no evidence of infiltrate or effusion--a normal appearing chest radiograph. \par \par PFT revealed normal flows, with a FEV1 of 2.61L, which is 81% of predicted, with a flattened inspiratory limb. \par \par 6 minute walk test reveals a low saturation of 96% with no evidence of dyspnea or fatigue; walked 586.8 meters. \par \par FENO was 39; a normal value being less than 25\par Fractional exhaled nitric oxide (FENO) is regarded as a simple, noninvasive method for assessing eosinophilic airway inflammation. Produced by a variety of cells within the lung, nitric oxide (NO) concentrations are generally low in healthy individuals. However, high concentrations of NO appear to be involved in nonspecific host defense mechanisms and chronic inflammatory diseases such as asthma. The American Thoracic Society (ATS) therefore has recommended using FENO to aid in the diagnosis and monitoring of eosinophilic airway inflammation and asthma, and for identifying steroid responsive individuals whose chronic respiratory symptoms may be caused by airway inflammation.

## 2021-02-09 NOTE — REASON FOR VISIT
[Initial] : an initial visit [TextBox_44] : SOB, Asthma, Allergies, GERD, s/p COVID-19 1/2021, ?SAMY

## 2021-02-09 NOTE — ADDENDUM
[FreeTextEntry1] : Documented by Zaid Barry acting as a scribe for Dr. Sly Estrada on 02/09/2021.\par \par All medical record entries made by the Scribe were at my, Dr. Sly Estrada's, direction and personally dictated by me on 02/09/2021. I have reviewed the chart and agree that the record accurately reflects my personal performance of the history, physical exam, assessment and plan. I have also personally directed, reviewed, and agree with the discharge instructions.

## 2021-02-11 LAB
A ALTERNATA IGE QN: <0.1 KUA/L
A FUMIGATUS IGE QN: <0.1 KUA/L
C ALBICANS IGE QN: <0.1 KUA/L
C HERBARUM IGE QN: <0.1 KUA/L
CAT DANDER IGE QN: 0.13 KUA/L
COMMON RAGWEED IGE QN: <0.1 KUA/L
D FARINAE IGE QN: 1.45 KUA/L
D PTERONYSS IGE QN: 1.9 KUA/L
DEPRECATED A ALTERNATA IGE RAST QL: 0
DEPRECATED A FUMIGATUS IGE RAST QL: 0
DEPRECATED C ALBICANS IGE RAST QL: 0
DEPRECATED C HERBARUM IGE RAST QL: 0
DEPRECATED CAT DANDER IGE RAST QL: NORMAL
DEPRECATED COMMON RAGWEED IGE RAST QL: 0
DEPRECATED D FARINAE IGE RAST QL: 2
DEPRECATED D PTERONYSS IGE RAST QL: 2
DEPRECATED DOG DANDER IGE RAST QL: 3
DEPRECATED M RACEMOSUS IGE RAST QL: 0
DEPRECATED ROACH IGE RAST QL: 3
DEPRECATED TIMOTHY IGE RAST QL: 0
DEPRECATED WHITE OAK IGE RAST QL: 0
DOG DANDER IGE QN: 4.94 KUA/L
M RACEMOSUS IGE QN: <0.1 KUA/L
ROACH IGE QN: 3.67 KUA/L
TIMOTHY IGE QN: <0.1 KUA/L
TOTAL IGE SMQN RAST: 434 KU/L
WHITE OAK IGE QN: <0.1 KUA/L

## 2021-02-12 LAB
CLAM IGE QN: <0.1 KUA/L
CODFISH IGE QN: <0.1 KUA/L
CORN IGE QN: <0.1 KUA/L
COW MILK IGE QN: <0.1 KUA/L
DEPRECATED CLAM IGE RAST QL: 0
DEPRECATED CODFISH IGE RAST QL: 0
DEPRECATED CORN IGE RAST QL: 0
DEPRECATED COW MILK IGE RAST QL: 0
DEPRECATED EGG WHITE IGE RAST QL: 0
DEPRECATED PEANUT IGE RAST QL: 0
DEPRECATED SCALLOP IGE RAST QL: 0.21 KUA/L
DEPRECATED SESAME SEED IGE RAST QL: 0
DEPRECATED SHRIMP IGE RAST QL: 1
DEPRECATED SOYBEAN IGE RAST QL: 0
DEPRECATED WALNUT IGE RAST QL: 0
DEPRECATED WHEAT IGE RAST QL: 0
EGG WHITE IGE QN: <0.1 KUA/L
PEANUT IGE QN: <0.1 KUA/L
SCALLOP IGE QN: 0.35 KUA/L
SCALLOP IGE QN: NORMAL
SESAME SEED IGE QN: <0.1 KUA/L
SOYBEAN IGE QN: <0.1 KUA/L
WALNUT IGE QN: <0.1 KUA/L
WHEAT IGE QN: <0.1 KUA/L

## 2021-02-24 ENCOUNTER — NON-APPOINTMENT (OUTPATIENT)
Age: 46
End: 2021-02-24

## 2021-02-25 ENCOUNTER — APPOINTMENT (OUTPATIENT)
Dept: INTERNAL MEDICINE | Facility: CLINIC | Age: 46
End: 2021-02-25

## 2021-02-25 ENCOUNTER — NON-APPOINTMENT (OUTPATIENT)
Age: 46
End: 2021-02-25

## 2021-04-08 ENCOUNTER — APPOINTMENT (OUTPATIENT)
Dept: PULMONOLOGY | Facility: CLINIC | Age: 46
End: 2021-04-08
Payer: COMMERCIAL

## 2021-04-08 ENCOUNTER — NON-APPOINTMENT (OUTPATIENT)
Age: 46
End: 2021-04-08

## 2021-04-08 VITALS
HEART RATE: 88 BPM | RESPIRATION RATE: 17 BRPM | HEIGHT: 63 IN | DIASTOLIC BLOOD PRESSURE: 70 MMHG | SYSTOLIC BLOOD PRESSURE: 110 MMHG | OXYGEN SATURATION: 97 % | BODY MASS INDEX: 30.48 KG/M2 | WEIGHT: 172 LBS | TEMPERATURE: 97.9 F

## 2021-04-08 PROCEDURE — 99214 OFFICE O/P EST MOD 30 MIN: CPT | Mod: 25

## 2021-04-08 PROCEDURE — 99072 ADDL SUPL MATRL&STAF TM PHE: CPT

## 2021-04-08 PROCEDURE — 94010 BREATHING CAPACITY TEST: CPT

## 2021-04-08 PROCEDURE — 95012 NITRIC OXIDE EXP GAS DETER: CPT

## 2021-04-08 NOTE — PHYSICAL EXAM
[No Acute Distress] : no acute distress [Normal Oropharynx] : normal oropharynx [III] : Mallampati Class: III [Normal Appearance] : normal appearance [Normal Rate/Rhythm] : normal rate/rhythm [No Neck Mass] : no neck mass [Normal S1, S2] : normal s1, s2 [No Murmurs] : no murmurs [No Resp Distress] : no resp distress [Clear to Auscultation Bilaterally] : clear to auscultation bilaterally [No Abnormalities] : no abnormalities [Benign] : benign [Normal Gait] : normal gait [No Clubbing] : no clubbing [No Cyanosis] : no cyanosis [No Edema] : no edema [FROM] : FROM [Normal Color/ Pigmentation] : normal color/ pigmentation [No Focal Deficits] : no focal deficits [Oriented x3] : oriented x3 [Normal Affect] : normal affect [TextBox_2] : OW [TextBox_68] : I:E ratio 1:3; clear

## 2021-04-08 NOTE — PROCEDURE
[FreeTextEntry1] : FENO was 13; a normal value being less than 25\par Fractional exhaled nitric oxide (FENO) is regarded as a simple, noninvasive method for assessing eosinophilic airway inflammation. Produced by a variety of cells within the lung, nitric oxide (NO) concentrations are generally low in healthy individuals. However, high concentrations of NO appear to be involved in nonspecific host defense mechanisms and chronic inflammatory diseases such as asthma. The American Thoracic Society (ATS) therefore has recommended using FENO to aid in the diagnosis and monitoring of eosinophilic airway inflammation and asthma, and for identifying steroid responsive individuals whose chronic respiratory symptoms may be caused by airway inflammation. \par \par PFT revealed normal flows, with a FEV1 of 3.12 L, which is 77% of predicted, normal lung volumes, and with a normal flow volume loop.

## 2021-04-08 NOTE — REASON FOR VISIT
[Follow-Up] : a follow-up visit [Spouse] : spouse [TextBox_44] : SOB, Asthma, Allergies, GERD, s/p COVID-19 1/2021, ?SAMY

## 2021-04-08 NOTE — ASSESSMENT
[FreeTextEntry1] : Mr. RAMIREZ is a 46 year old male with a history of nonsmoker, chicken pox, measles, Pre-Diabetes, Asthma, likely Allergies, s/p COVID-19 infection who comes into the office today for pulmonary follow up for s/p COVID-19, SOB, Asthma. (improved) \par \par The patient's shortness of breath is multifactorial due to:\par -pulmonary disease \par      - Asthma \par      -s/p COVID-19 infection\par -poor breathing mechanics \par -overweight/out of shape\par -?cardiac disease - no evidence\par \par \par Problem 1: Asthma \par -continue Trelegy 200 1 inhalation QD \par -continue Albuterol (0.83) by nebulizer QID  (gargle and rinse after use) \par \par -Asthma is believed to be caused by inherited (genetic) and environmental factor, but its exact cause is unknown. Asthma may be triggered by allergens, lung infections, or irritants in the air. Asthma triggers are different for each person \par -Inhaler technique reviewed as well as oral hygiene techniques reviewed with patient. Avoidance of cold air, extremes of temperature, rescue inhaler should be used before exercise. Order of medication reviewed with patient. Recommended use of a cool mist humidifier in the bedroom.\par \par problem 1A: Elevated IgE (434) \par -candidate for xolair \par \par Problem 2: s/p COVID-19 infection (radiographically clear) \par -completing Steroids \par -no vaccination until May 2021. \par \par COVID-19 precautionary Immune Support Recommendations:\par -OTC Vitamin C 1000mg BID \par -OTC Quercetin 500mg BID \par -OTC Zinc 50 mg per day \par -OTC Melatonin 5 mg a night \par -OTC Vitamin D 2000mg per day \par -OTC Tonic Water 8oz per day\par -Water 0.5-1 gallon per day\par \par Additional Support Supplements: \par -Liposomal Glutathione 500 mg BID\par -SPM 1 tablet BID \par -Berberine 1000 mg BID  \par \par -add full Aspirin (Ecotrin) qAM \par \par Asthma and COVID19:\par You need to make sure your asthma is under control. This often requires the use of inhaled corticosteroids (and sometimes oral corticosteroids). Inhaled corticosteroids do not likely reduce your immune system’s ability to fight infections, but oral corticosteroids may. It is important to use the steps above to protect yourself to limit your exposure to any respiratory virus. \par \par Problem 3: Allergies\par -s/p blood work to include: (+)IgE level, (-)eosinophil level, (-)vitamin D level, (+)food IgE level, (+) and asthma profile \par -continue Olopatadine 0.6% at 1 sniff/nostril BID \par \par Environmental measures for allergies were encouraged including mattress and pillow cover, air purifier, and environmental controls. \par \par Problem 3A: Low Vitamin D\par -recommended Vitamin D supplement\par Has been associated with asthma exacerbations and increased allergic symptoms. The goal based on recent information is maintaining levels between 50-70 and low normal is 30. Recommended 50,000 units every two weeks to once a month depending on the level.\par \par Problem 4: GERD\par -OTC Pepcid - if needed \par -Rule of 2s: avoid eating too much, eating too fast, eating too late, eating too spicy, eating too lousy, eating two hours before bed.\par -Things to avoid including overeating, spicy foods, tight clothing, eating within three hours of bed, this list is not all inclusive. \par -For treatment of reflux, possible options discussed including diet control, H2 blockers, PPIs, as well as coating motility agents discussed as treatment options. Timing of meals and proximity of last meal to sleep were discussed. If symptoms persist, a formal gastrointestinal evaluation is needed. \par \par Problem 5: ?SAMY (elevated Mallampati Class, neck size, nocturia) \par -Complete Home Sleep Study (Insurance Issues; Complete through [facility]) \par -recommended Slumber Bump\par -recommended Somnifix\par -recommended OxyAid /NasalAid\par \par Sleep apnea is associated with adverse clinical consequences which an affect most organ systems. Cardiovascular disease risk includes arrhythmias, atrial fibrillation, hypertension, coronary artery disease, and stroke. Metabolic disorders include diabetes type 2, non-alcoholic fatty liver disease. Mood disorder especially depression; and cognitive decline especially in the elderly. Associations with chronic reflux/Pike’s esophagus some but not all inclusive. \par -Reasons include arousal consistent with hypopnea; respiratory events most prominent in REM sleep or supine position; therefore sleep staging and body position are important for accurate diagnosis and estimation of AHI. \par \par Problem 6: Poor Mechanics of Breathing\par - Proper breathing techniques were reviewed with an emphasis of exhalation. Patient instructed to breath in for 1 second and out for four seconds. Patient was encouraged to not talk while walking. \par \par Problem 7: Overweight\par -Weight loss, exercise, and diet control were discussed and are highly encouraged. Treatment options were given such as, aqua therapy, and contacting a nutritionist. Recommended to use the elliptical, stationary bike, less use of treadmill. Mindful eating was explained to the patient Obesity is associated with worsening asthma, shortness of breath, and potential for cardiac disease, diabetes, and other underlying medical conditions. \par \par Problem 8: health maintenance\par -recommended to stay hydrated and avoid fluids 3 hours before bed.\par -?s/p influenza vaccine 2020\par -recommended strep pneumonia vaccines after age 65: Prevnar-13 vaccine, followed by Pneumo vaccine 23 one year following\par -recommended early intervention for URIs\par -recommended regular osteoporosis evaluations\par -recommended early dermatological evaluations\par -recommended after the age of 50 to the age of 70, colonoscopy every 5 years \par \par  Follow up in 6-8 weeks\par -he  is recommended to call with any changes, questions, or concerns.

## 2021-04-08 NOTE — HISTORY OF PRESENT ILLNESS
[TextBox_4] : Mr. RAMIREZ is a 46 year old male presenting to the office today for follow up pulmonary evaluation. His chief complaint is \par \par -he notes left shoulder pain described as pinching sensation and sharp pain exacerbated by movement that intermittently radiates to left chest and left arm\par -he notes positionally sleeps on right side predominantly\par -he notes Right hand dominant\par -he notes heartburn and reflux active, exacerbated by dietary triggers\par -he notes intermittent sour taste in mouth\par -he denies palpitations\par -he notes snores\par -he notes cough quiet\par -he notes SOB improved decreased in frequency\par -he denies wheeze\par -he notes sinuses are quiet, denies leaky, drippy, congestion\par -he notes energy levels 6/10 on average\par -he notes intermittently awakening fatigued\par -he notes exercising working\par -he notes weight stable\par \par \par -denies any chest pain, chest pressure, diarrhea, constipation, dysphagia, dizziness, leg swelling, leg pain, myalgias, arthralgias, itchy eyes, itchy ears.

## 2021-04-08 NOTE — ADDENDUM
[FreeTextEntry1] : Documented by Carroll Herrera acting as a scribe for Dr. Sly Estrada on 04/08/2021.\par \par All medical record entries made by the Scribe were at my, Dr. Sly Estrada's, direction and personally dictated by me on 04/08/2021 . I have reviewed the chart and agree that the record accurately reflects my personal performance of the history, physical exam, assessment and plan. I have also personally directed, reviewed, and agree with the discharge instructions. \par

## 2021-08-11 ENCOUNTER — NON-APPOINTMENT (OUTPATIENT)
Age: 46
End: 2021-08-11

## 2021-08-11 ENCOUNTER — APPOINTMENT (OUTPATIENT)
Dept: PULMONOLOGY | Facility: CLINIC | Age: 46
End: 2021-08-11
Payer: COMMERCIAL

## 2021-08-11 VITALS
HEART RATE: 90 BPM | SYSTOLIC BLOOD PRESSURE: 110 MMHG | WEIGHT: 170 LBS | OXYGEN SATURATION: 97 % | TEMPERATURE: 97.7 F | HEIGHT: 64 IN | RESPIRATION RATE: 16 BRPM | DIASTOLIC BLOOD PRESSURE: 70 MMHG | BODY MASS INDEX: 29.02 KG/M2

## 2021-08-11 PROCEDURE — 94010 BREATHING CAPACITY TEST: CPT

## 2021-08-11 PROCEDURE — 99214 OFFICE O/P EST MOD 30 MIN: CPT | Mod: 25

## 2021-08-11 PROCEDURE — 95012 NITRIC OXIDE EXP GAS DETER: CPT

## 2021-08-11 RX ORDER — ALBUTEROL SULFATE 90 UG/1
108 (90 BASE) INHALANT RESPIRATORY (INHALATION)
Qty: 3 | Refills: 0 | Status: ACTIVE | COMMUNITY
Start: 2019-09-03 | End: 1900-01-01

## 2021-08-11 NOTE — PROCEDURE
[FreeTextEntry1] :  FENO was 17; normal value being less than 25\par Fractional exhaled nitric oxide (FENO) is regarded as a simple, noninvasive method for assessing eosinophilic airway inflammation. Produced by a variety of cells within the lung, nitric oxide (NO) concentrations are generally low in healthy individuals. However, high concentrations of NO appear to be involved in nonspecific host defense mechanisms and chronic inflammatory diseases such as asthma. The American Thoracic Society (ATS) therefore has recommended using FENO to aid in the diagnosis and monitoring of eosinophilic airway inflammation and asthma, and for identifying steroid responsive individuals whose chronic respiratory symptoms may be airway inflammation. \par \par PFT reveals normal flows, with an FEV1 of   2.71L, which is  85% of predicted, with normal flow volume loop

## 2021-08-11 NOTE — HISTORY OF PRESENT ILLNESS
[TextBox_4] : Mr. RAMIREZ is a 46 year old male presenting to the office today for follow up pulmonary evaluation. His chief complaint is \par \par -he notes feeling better\par -he notes energy level is good\par -he notes energy could be better and is at 8/10\par - He  notes bowels are regular \par -he notes intermittent reflux and heartburn when eating greasy foods or too late\par -he notes walking a lot for exercise but wants to do more\par -he notes carrying tables and chairs for work as exercise \par -s/p Pfizer x 2 tolerated well with no reaction\par -he notes allergies are fine with no itchy eyes and ears\par -he notes unable to do sleep study \par -he notes he is still snoring \par -he denies taking any new medications, vitamins, or supplements. \par -he notes off inhaler for 3 months\par \par - He  denies any visual issues, headaches, nausea, vomiting, fever, chills, sweats, chest pains, chest pressure, diarrhea, constipation, dysphagia, myalgia, dizziness, leg swelling, leg pain, itchy eyes, itchy ears, heartburn, reflux, or sour taste in the mouth.

## 2021-08-11 NOTE — ASSESSMENT
[FreeTextEntry1] : Mr. RAMIREZ is a 46 year old male with a history of nonsmoker, chicken pox, measles, Pre-Diabetes, Asthma, likely Allergies, s/p COVID-19 infection who comes into the office today for pulmonary follow up for s/p COVID-19, SOB, Asthma. (improved) -stable #sleep/snore\par \par The patient's shortness of breath is multifactorial due to:\par -pulmonary disease \par      - Asthma \par      -s/p COVID-19 infection\par -poor breathing mechanics \par -overweight/out of shape\par -?cardiac disease - no evidence\par \par \par Problem 1: Asthma -stable\par -continue Trelegy 200 1 inhalation QD \par -continue Albuterol (0.83) by nebulizer QID  (gargle and rinse after use) \par \par -Asthma is believed to be caused by inherited (genetic) and environmental factor, but its exact cause is unknown. Asthma may be triggered by allergens, lung infections, or irritants in the air. Asthma triggers are different for each person \par -Inhaler technique reviewed as well as oral hygiene techniques reviewed with patient. Avoidance of cold air, extremes of temperature, rescue inhaler should be used before exercise. Order of medication reviewed with patient. Recommended use of a cool mist humidifier in the bedroom.\par \par problem 1A: Elevated IgE (434) \par -candidate for xolair \par \par Problem 2: s/p COVID-19 infection (radiographically clear) \par -s/p Pfizer x 2\par -completing Steroids \par -s/p vaccination until May 2021. \par \par COVID-19 precautionary Immune Support Recommendations:\par -OTC Vitamin C 1000mg BID \par -OTC Quercetin 500mg BID \par -OTC Zinc 50 mg per day \par -OTC Melatonin 5 mg a night \par -OTC Vitamin D 2000mg per day \par -OTC Tonic Water 8oz per day\par -Water 0.5-1 gallon per day\par \par Additional Support Supplements: \par -Liposomal Glutathione 500 mg BID\par -SPM 1 tablet BID \par -Berberine 1000 mg BID  \par \par -s/pfull Aspirin (Ecotrin) qAM \par \par Asthma and COVID19:\par You need to make sure your asthma is under control. This often requires the use of inhaled corticosteroids (and sometimes oral corticosteroids). Inhaled corticosteroids do not likely reduce your immune system’s ability to fight infections, but oral corticosteroids may. It is important to use the steps above to protect yourself to limit your exposure to any respiratory virus. \par \par Problem 3: Allergies\par -s/p blood work to include: (+)IgE level, (-)eosinophil level, (-)vitamin D level, (+)food IgE level, (+) and asthma profile \par -continue Olopatadine 0.6% at 1 sniff/nostril BID \par \par Environmental measures for allergies were encouraged including mattress and pillow cover, air purifier, and environmental controls. \par \par Problem 3A: Low Vitamin D\par -recommended Vitamin D supplement\par Has been associated with asthma exacerbations and increased allergic symptoms. The goal based on recent information is maintaining levels between 50-70 and low normal is 30. Recommended 50,000 units every two weeks to once a month depending on the level.\par \par Problem 4: GERD\par -OTC Pepcid - if needed \par -Rule of 2s: avoid eating too much, eating too fast, eating too late, eating too spicy, eating too lousy, eating two hours before bed.\par -Things to avoid including overeating, spicy foods, tight clothing, eating within three hours of bed, this list is not all inclusive. \par -For treatment of reflux, possible options discussed including diet control, H2 blockers, PPIs, as well as coating motility agents discussed as treatment options. Timing of meals and proximity of last meal to sleep were discussed. If symptoms persist, a formal gastrointestinal evaluation is needed. \par \par Problem 5: ?SAMY (elevated Mallampati Class, neck size, nocturia) \par -Complete Home Sleep Study (Insurance Issues; Complete through [facility]) \par -recommended Slumber Bump\par -recommended Somnifix\par -recommended OxyAid /NasalAid\par \par Sleep apnea is associated with adverse clinical consequences which an affect most organ systems. Cardiovascular disease risk includes arrhythmias, atrial fibrillation, hypertension, coronary artery disease, and stroke. Metabolic disorders include diabetes type 2, non-alcoholic fatty liver disease. Mood disorder especially depression; and cognitive decline especially in the elderly. Associations with chronic reflux/Pike’s esophagus some but not all inclusive. \par -Reasons include arousal consistent with hypopnea; respiratory events most prominent in REM sleep or supine position; therefore sleep staging and body position are important for accurate diagnosis and estimation of AHI. \par \par Problem 6: Poor Mechanics of Breathing\par - Proper breathing techniques were reviewed with an emphasis of exhalation. Patient instructed to breath in for 1 second and out for four seconds. Patient was encouraged to not talk while walking. \par \par Problem 7: Overweight\par -Weight loss, exercise, and diet control were discussed and are highly encouraged. Treatment options were given such as, aqua therapy, and contacting a nutritionist. Recommended to use the elliptical, stationary bike, less use of treadmill. Mindful eating was explained to the patient Obesity is associated with worsening asthma, shortness of breath, and potential for cardiac disease, diabetes, and other underlying medical conditions. \par \par Problem 8: health maintenance\par -recommended "slumber bump"\par -recommended to stay hydrated and avoid fluids 3 hours before bed.\par -?s/p influenza vaccine 2020\par -recommended strep pneumonia vaccines after age 65: Prevnar-13 vaccine, followed by Pneumo vaccine 23 one year following\par -recommended early intervention for URIs\par -recommended regular osteoporosis evaluations\par -recommended early dermatological evaluations\par -recommended after the age of 50 to the age of 70, colonoscopy every 5 years \par \par  Follow up in 6-8 weeks\par -he  is recommended to call with any changes, questions, or concerns.

## 2021-08-11 NOTE — ADDENDUM
[FreeTextEntry1] : Documented by Kaylen Maldonado acting as a scribe for Dr. Sly Estrada on 08/11/2021 \par \par All medical record entries made by the Scribe were at my, Dr. Sly Estrada's, direction and personally dictated by me on 08/11/2021 . I have reviewed the chart and agree that the record accurately reflects my personal performance of the history, physical exam, assessment and plan. I have also personally directed, reviewed, and agree with the discharge instructions.

## 2021-09-14 ENCOUNTER — APPOINTMENT (OUTPATIENT)
Dept: SLEEP CENTER | Facility: CLINIC | Age: 46
End: 2021-09-14

## 2021-11-23 ENCOUNTER — OUTPATIENT (OUTPATIENT)
Dept: OUTPATIENT SERVICES | Facility: HOSPITAL | Age: 46
LOS: 1 days | End: 2021-11-23
Payer: COMMERCIAL

## 2021-11-23 ENCOUNTER — APPOINTMENT (OUTPATIENT)
Dept: SLEEP CENTER | Facility: CLINIC | Age: 46
End: 2021-11-23
Payer: COMMERCIAL

## 2021-11-23 PROCEDURE — 95806 SLEEP STUDY UNATT&RESP EFFT: CPT | Mod: 26

## 2021-11-23 PROCEDURE — G0399: CPT

## 2021-11-30 DIAGNOSIS — G47.33 OBSTRUCTIVE SLEEP APNEA (ADULT) (PEDIATRIC): ICD-10-CM

## 2021-12-13 ENCOUNTER — APPOINTMENT (OUTPATIENT)
Dept: PULMONOLOGY | Facility: CLINIC | Age: 46
End: 2021-12-13

## 2021-12-13 ENCOUNTER — APPOINTMENT (OUTPATIENT)
Dept: PULMONOLOGY | Facility: CLINIC | Age: 46
End: 2021-12-13
Payer: COMMERCIAL

## 2021-12-13 ENCOUNTER — NON-APPOINTMENT (OUTPATIENT)
Age: 46
End: 2021-12-13

## 2021-12-13 VITALS
RESPIRATION RATE: 17 BRPM | HEIGHT: 64 IN | TEMPERATURE: 97.3 F | OXYGEN SATURATION: 97 % | BODY MASS INDEX: 29.02 KG/M2 | DIASTOLIC BLOOD PRESSURE: 70 MMHG | WEIGHT: 170 LBS | SYSTOLIC BLOOD PRESSURE: 120 MMHG | HEART RATE: 85 BPM

## 2021-12-13 PROCEDURE — 95012 NITRIC OXIDE EXP GAS DETER: CPT

## 2021-12-13 PROCEDURE — 94010 BREATHING CAPACITY TEST: CPT

## 2021-12-13 PROCEDURE — 99214 OFFICE O/P EST MOD 30 MIN: CPT | Mod: 25

## 2021-12-13 RX ORDER — AMOXICILLIN 500 MG/1
500 CAPSULE ORAL
Qty: 21 | Refills: 0 | Status: DISCONTINUED | COMMUNITY
Start: 2021-10-19

## 2021-12-13 RX ORDER — IBUPROFEN 400 MG/1
400 TABLET, FILM COATED ORAL
Qty: 15 | Refills: 0 | Status: DISCONTINUED | COMMUNITY
Start: 2021-10-19

## 2021-12-13 RX ORDER — MONTELUKAST 10 MG/1
10 TABLET, FILM COATED ORAL
Qty: 1 | Refills: 1 | Status: ACTIVE | COMMUNITY
Start: 2021-12-13 | End: 1900-01-01

## 2021-12-13 RX ORDER — CIPROFLOXACIN AND DEXAMETHASONE 3; 1 MG/ML; MG/ML
0.3-0.1 SUSPENSION/ DROPS AURICULAR (OTIC)
Qty: 8 | Refills: 0 | Status: DISCONTINUED | COMMUNITY
Start: 2021-10-19

## 2021-12-13 NOTE — HISTORY OF PRESENT ILLNESS
[TextBox_4] : Mr. RAMIREZ is a 46 year old male presenting to the office today for follow up pulmonary evaluation. His chief complaint is \par \par -he notes intermittent cough onset few weeks but not apparent at night \par -he notes working outside and using a mask when outside \par -He notes that bowels are regular  \par -he notes intermittent reflux onset past few days but now resolved \par -he notes weight is stable \par -he notes walking for exercise \par -he notes sleep is good \par -s/p ear infection with some residual issues\par -he notes hearing decreased from baseline \par -he notes use of Theraflu and Tylenol \par -he notes drinking tea \par -he notes sinuses are quiet with no PND and congestion  \par -he notes dry throat \par -he notes cough comes from chest \par -he notes stopping use of Trelegy for 3 weeks but restarted when he started coughing \par -he denies having a rescue inhaler \par \par -denies any visual issues, headaches, nausea, vomiting, fever, chills, sweats, chest pain, chest pressure, diarrhea, constipation, dysphagia, dizziness, leg swelling, leg pain, itchy eyes,heartburn, or sour taste in the mouth.

## 2021-12-13 NOTE — PROCEDURE
[FreeTextEntry1] : FENO was 13 ; a normal value being less than 25\par Fractional exhaled nitric oxide (FENO) is regarded as a simple, noninvasive method for assessing eosinophilic airway inflammation. Produced by a variety of cells within the lung, nitric oxide (NO) concentrations are generally low in healthy individuals. However, high concentrations of NO appear to be involved in nonspecific host defense mechanisms and chronic inflammatory diseases such as asthma. The American Thoracic Society (ATS) therefore has recommended using FENO to aid in the diagnosis and monitoring of eosinophilic airway inflammation and asthma, and for identifying steroid responsive individuals whose chronic respiratory symptoms may be caused by airway inflammation. \par \par PFT revealed normal flow , with a FEV1 of  3.22L,which is 80 % of predicted with a flat inspiratory wheeze

## 2021-12-13 NOTE — ASSESSMENT
[FreeTextEntry1] : Mr. RAMIREZ is a 46 year old male with a history of nonsmoker, chicken pox, measles, Pre-Diabetes, Asthma, likely Allergies, s/p COVID-19 infection who comes into the office today for pulmonary follow up for s/p COVID-19, SOB, Asthma.- active asthma \par \par The patient's shortness of breath is multifactorial due to:\par -pulmonary disease \par      - Asthma \par      -s/p COVID-19 infection\par -poor breathing mechanics \par -overweight/out of shape\par -?cardiac disease - no evidence\par \par \par Problem 1: Asthma -active\par -add Singulair 10 mg QHS \par -add Ventolin 2 puffs Q6H, pre-exercise \par -continue Trelegy 200 1 inhalation QD \par -continue Albuterol (0.83) by nebulizer QID  (gargle and rinse after use) \par \par -Asthma is believed to be caused by inherited (genetic) and environmental factor, but its exact cause is unknown. Asthma may be triggered by allergens, lung infections, or irritants in the air. Asthma triggers are different for each person \par -Inhaler technique reviewed as well as oral hygiene techniques reviewed with patient. Avoidance of cold air, extremes of temperature, rescue inhaler should be used before exercise. Order of medication reviewed with patient. Recommended use of a cool mist humidifier in the bedroom.\par \par problem 1A: Elevated IgE (434) \par -candidate for xolair \par \par Problem 2: s/p COVID-19 infection (radiographically clear) 12/2020\par -s/p Pfizer x 2\par -s/p Steroids \par \par COVID-19 precautionary Immune Support Recommendations:\par -OTC Vitamin C 1000mg BID \par -OTC Quercetin 500mg BID \par -OTC Zinc 50 mg per day \par -OTC Melatonin 5 mg a night \par -OTC Vitamin D 2000mg per day \par -OTC Tonic Water 8oz per day\par -Water 0.5-1 gallon per day\par \par Additional Support Supplements: \par -Liposomal Glutathione 500 mg BID\par -SPM 1 tablet BID \par -Berberine 1000 mg BID  \par \par -s/pfull Aspirin (Ecotrin) qAM \par \par Asthma and COVID19:\par You need to make sure your asthma is under control. This often requires the use of inhaled corticosteroids (and sometimes oral corticosteroids). Inhaled corticosteroids do not likely reduce your immune system’s ability to fight infections, but oral corticosteroids may. It is important to use the steps above to protect yourself to limit your exposure to any respiratory virus. \par \par Problem 3: Allergies\par -s/p blood work to include: (+)IgE level, (-)eosinophil level, (-)vitamin D level, (+)food IgE level, (+) and asthma profile \par -continue Olopatadine 0.6% at 1 sniff/nostril BID \par \par Environmental measures for allergies were encouraged including mattress and pillow cover, air purifier, and environmental controls. \par \par Problem 3A: Low Vitamin D\par -recommended Vitamin D supplement\par Has been associated with asthma exacerbations and increased allergic symptoms. The goal based on recent information is maintaining levels between 50-70 and low normal is 30. Recommended 50,000 units every two weeks to once a month depending on the level.\par \par Problem 4: GERD\par -OTC Pepcid - if needed \par -Rule of 2s: avoid eating too much, eating too fast, eating too late, eating too spicy, eating too lousy, eating two hours before bed.\par -Things to avoid including overeating, spicy foods, tight clothing, eating within three hours of bed, this list is not all inclusive. \par -For treatment of reflux, possible options discussed including diet control, H2 blockers, PPIs, as well as coating motility agents discussed as treatment options. Timing of meals and proximity of last meal to sleep were discussed. If symptoms persist, a formal gastrointestinal evaluation is needed. \par \par Problem 5: ?SAMY (elevated Mallampati Class, neck size, nocturia) \par -Complete Home Sleep Study (Insurance Issues; Complete through [facility]) \par -recommended Slumber Bump\par -recommended Somnifix\par -recommended OxyAid /NasalAid\par \par Sleep apnea is associated with adverse clinical consequences which an affect most organ systems. Cardiovascular disease risk includes arrhythmias, atrial fibrillation, hypertension, coronary artery disease, and stroke. Metabolic disorders include diabetes type 2, non-alcoholic fatty liver disease. Mood disorder especially depression; and cognitive decline especially in the elderly. Associations with chronic reflux/Pike’s esophagus some but not all inclusive. \par -Reasons include arousal consistent with hypopnea; respiratory events most prominent in REM sleep or supine position; therefore sleep staging and body position are important for accurate diagnosis and estimation of AHI. \par \par Problem 6: Poor Mechanics of Breathing\par - Proper breathing techniques were reviewed with an emphasis of exhalation. Patient instructed to breath in for 1 second and out for four seconds. Patient was encouraged to not talk while walking. \par \par Problem 7: Overweight\par -Weight loss, exercise, and diet control were discussed and are highly encouraged. Treatment options were given such as, aqua therapy, and contacting a nutritionist. Recommended to use the elliptical, stationary bike, less use of treadmill. Mindful eating was explained to the patient Obesity is associated with worsening asthma, shortness of breath, and potential for cardiac disease, diabetes, and other underlying medical conditions. \par \par Problem 8: health maintenance\par -recommended "slumber bump"\par -recommended to stay hydrated and avoid fluids 3 hours before bed.\par -?s/p influenza vaccine 2020\par -recommended strep pneumonia vaccines after age 65: Prevnar-13 vaccine, followed by Pneumo vaccine 23 one year following\par -recommended early intervention for URIs\par -recommended regular osteoporosis evaluations\par -recommended early dermatological evaluations\par -recommended after the age of 50 to the age of 70, colonoscopy every 5 years \par \par  Follow up in 6-8 weeks\par -he  is recommended to call with any changes, questions, or concerns.

## 2021-12-13 NOTE — ADDENDUM
[FreeTextEntry1] : Documented by Mary Jhaveri acting as a scribe for Dr. Sly Estrada on 12/13/2021 \par \par All medical record entries made by the Scribe were at my, Dr. Sly Estrada's, direction and personally dictated by me on 12/13/2021 . I have reviewed the chart and agree that the record accurately reflects my personal performance of the history, physical exam, assessment and plan. I have also personally directed, reviewed, and agree with the discharge instructions

## 2022-03-22 NOTE — ED PROVIDER NOTE - CONSTITUTIONAL, MLM
normal... Well appearing, well nourished, awake, alert, oriented to person, place, time/situation and in no apparent distress. Render Risk Assessment In Note?: no Detail Level: Simple Additional Notes: Reviewed all medications losartan potassium hydrochlorothiazide could possibly be a drug induced reaction causing lichen planus. Discussed  calling Dr. Sparks. Patient will discontinue hydrochlorothiazide 2months before proceeding with the methotrexate.

## 2022-04-11 ENCOUNTER — APPOINTMENT (OUTPATIENT)
Dept: PULMONOLOGY | Facility: CLINIC | Age: 47
End: 2022-04-11
Payer: COMMERCIAL

## 2022-04-11 ENCOUNTER — NON-APPOINTMENT (OUTPATIENT)
Age: 47
End: 2022-04-11

## 2022-04-11 VITALS
TEMPERATURE: 97.9 F | DIASTOLIC BLOOD PRESSURE: 80 MMHG | WEIGHT: 174 LBS | OXYGEN SATURATION: 98 % | RESPIRATION RATE: 17 BRPM | HEART RATE: 84 BPM | SYSTOLIC BLOOD PRESSURE: 130 MMHG | BODY MASS INDEX: 29.71 KG/M2 | HEIGHT: 64 IN

## 2022-04-11 DIAGNOSIS — U07.1 COVID-19: ICD-10-CM

## 2022-04-11 DIAGNOSIS — R76.8 OTHER SPECIFIED ABNORMAL IMMUNOLOGICAL FINDINGS IN SERUM: ICD-10-CM

## 2022-04-11 DIAGNOSIS — E66.3 OVERWEIGHT: ICD-10-CM

## 2022-04-11 DIAGNOSIS — R06.83 SNORING: ICD-10-CM

## 2022-04-11 DIAGNOSIS — J30.9 ALLERGIC RHINITIS, UNSPECIFIED: ICD-10-CM

## 2022-04-11 DIAGNOSIS — K21.9 GASTRO-ESOPHAGEAL REFLUX DISEASE W/OUT ESOPHAGITIS: ICD-10-CM

## 2022-04-11 DIAGNOSIS — R06.02 SHORTNESS OF BREATH: ICD-10-CM

## 2022-04-11 DIAGNOSIS — J45.909 UNSPECIFIED ASTHMA, UNCOMPLICATED: ICD-10-CM

## 2022-04-11 DIAGNOSIS — G47.33 OBSTRUCTIVE SLEEP APNEA (ADULT) (PEDIATRIC): ICD-10-CM

## 2022-04-11 PROCEDURE — 94729 DIFFUSING CAPACITY: CPT

## 2022-04-11 PROCEDURE — 94010 BREATHING CAPACITY TEST: CPT

## 2022-04-11 PROCEDURE — 99214 OFFICE O/P EST MOD 30 MIN: CPT | Mod: 25

## 2022-04-11 RX ORDER — DESLORATADINE 5 MG/1
5 TABLET ORAL DAILY
Qty: 90 | Refills: 1 | Status: ACTIVE | COMMUNITY
Start: 2022-04-11 | End: 1900-01-01

## 2022-04-11 NOTE — ASSESSMENT
[FreeTextEntry1] : Mr. RAMIREZ is a 47 year old male with a history of nonsmoker, chicken pox, measles, Pre-Diabetes, Asthma, likely Allergies, s/p COVID-19 infection who comes into the office today for pulmonary follow up for s/p COVID-19, SOB, Asthma.- active asthma (+) SAMY\par \par The patient's shortness of breath is multifactorial due to:\par -pulmonary disease \par      - Asthma \par      -s/p COVID-19 infection\par -poor breathing mechanics \par -overweight/out of shape\par -?cardiac disease - no evidence\par \par \par Problem 1: Asthma -active\par -add Singulair 10 mg QHS \par -add Ventolin 2 puffs Q6H, pre-exercise \par -continue Trelegy 200 1 inhalation QD \par -continue Albuterol (0.83) by nebulizer QID  (gargle and rinse after use) \par \par -Asthma is believed to be caused by inherited (genetic) and environmental factor, but its exact cause is unknown. Asthma may be triggered by allergens, lung infections, or irritants in the air. Asthma triggers are different for each person \par -Inhaler technique reviewed as well as oral hygiene techniques reviewed with patient. Avoidance of cold air, extremes of temperature, rescue inhaler should be used before exercise. Order of medication reviewed with patient. Recommended use of a cool mist humidifier in the bedroom.\par \par problem 1A: Elevated IgE (434) \par -candidate for xolair \par \par Problem 2: s/p COVID-19 infection (radiographically clear) 12/2020\par -s/p Pfizer x 2\par -s/p Steroids \par \par COVID-19 precautionary Immune Support Recommendations:\par -OTC Vitamin C 1000mg BID \par -OTC Quercetin 500mg BID \par -OTC Zinc 50 mg per day \par -OTC Melatonin 5 mg a night \par -OTC Vitamin D 2000mg per day \par -OTC Tonic Water 8oz per day\par -Water 0.5-1 gallon per day\par \par Additional Support Supplements: \par -Liposomal Glutathione 500 mg BID\par -SPM 1 tablet BID \par -Berberine 1000 mg BID  \par \par -s/pfull Aspirin (Ecotrin) qAM \par \par Asthma and COVID19:\par You need to make sure your asthma is under control. This often requires the use of inhaled corticosteroids (and sometimes oral corticosteroids). Inhaled corticosteroids do not likely reduce your immune system’s ability to fight infections, but oral corticosteroids may. It is important to use the steps above to protect yourself to limit your exposure to any respiratory virus. \par \par Problem 3: Allergies\par -s/p blood work to include: (+)IgE level, (-)eosinophil level, (-)vitamin D level, (+)food IgE level, (+) and asthma profile \par -continue Olopatadine 0.6% at 1 sniff/nostril BID \par -add Clarinex 5 mg QAM \par Environmental measures for allergies were encouraged including mattress and pillow cover, air purifier, and environmental controls. \par \par Problem 3A: Low Vitamin D\par -recommended Vitamin D supplement\par Has been associated with asthma exacerbations and increased allergic symptoms. The goal based on recent information is maintaining levels between 50-70 and low normal is 30. Recommended 50,000 units every two weeks to once a month depending on the level.\par \par Problem 4: GERD\par -OTC Pepcid - if needed \par -Rule of 2s: avoid eating too much, eating too fast, eating too late, eating too spicy, eating too lousy, eating two hours before bed.\par -Things to avoid including overeating, spicy foods, tight clothing, eating within three hours of bed, this list is not all inclusive. \par -For treatment of reflux, possible options discussed including diet control, H2 blockers, PPIs, as well as coating motility agents discussed as treatment options. Timing of meals and proximity of last meal to sleep were discussed. If symptoms persist, a formal gastrointestinal evaluation is needed. \par \par Problem 5: (+)SAMY (elevated Mallampati Class, neck size, nocturia) \par -s/p Home Sleep Study (Insurance Issues; Complete through [facility])- Dental device (FAN) \par -recommended Slumber Bump\par -recommended Somnifix\par -recommended OxyAid /NasalAid\par \par Sleep apnea is associated with adverse clinical consequences which an affect most organ systems. Cardiovascular disease risk includes arrhythmias, atrial fibrillation, hypertension, coronary artery disease, and stroke. Metabolic disorders include diabetes type 2, non-alcoholic fatty liver disease. Mood disorder especially depression; and cognitive decline especially in the elderly. Associations with chronic reflux/Pike’s esophagus some but not all inclusive. \par -Reasons include arousal consistent with hypopnea; respiratory events most prominent in REM sleep or supine position; therefore sleep staging and body position are important for accurate diagnosis and estimation of AHI. \par \par Problem 6: Poor Mechanics of Breathing\par -recommended Wim Hof and Buteyko breathing techniques \par - Proper breathing techniques were reviewed with an emphasis of exhalation. Patient instructed to breath in for 1 second and out for four seconds. Patient was encouraged to not talk while walking. \par \par Problem 7: Overweight\par -Weight loss, exercise, and diet control were discussed and are highly encouraged. Treatment options were given such as, aqua therapy, and contacting a nutritionist. Recommended to use the elliptical, stationary bike, less use of treadmill. Mindful eating was explained to the patient Obesity is associated with worsening asthma, shortness of breath, and potential for cardiac disease, diabetes, and other underlying medical conditions. \par \par Problem 8: health maintenance\par -recommended "slumber bump"\par -recommended to stay hydrated and avoid fluids 3 hours before bed.\par -?s/p influenza vaccine 2020\par -recommended strep pneumonia vaccines after age 65: Prevnar-13 vaccine, followed by Pneumo vaccine 23 one year following\par -recommended early intervention for URIs\par -recommended regular osteoporosis evaluations\par -recommended early dermatological evaluations\par -recommended after the age of 50 to the age of 70, colonoscopy every 5 years \par \par  Follow up in 6-8 weeks\par -he  is recommended to call with any changes, questions, or concerns.

## 2022-04-11 NOTE — HISTORY OF PRESENT ILLNESS
[TextBox_4] : Mr. RAMIREZ is a 46 year old male presenting to the office today for follow up pulmonary evaluation. His chief complaint is \par \par \par -he notes generally feeling good\par -he notes condition has improved\par -he notes now starting new job \par -he notes walking for exercise with no limitations\par -he denies arthralgia \par -he notes intermittent headache onset 1 week \par -he denies palpitations \par -he notes sleep quality had decreased from baseline due to interruptions \par -he notes itching, SOB, and congestion causes sleep to be interrupted \par -he notes using nebulizer at night \par -he notes appetite is stable \par -he notes slight increase in weight \par -he notes snoring \par \par -denies any visual issues, headaches, nausea, vomiting, fever, chills, sweats, chest pain, chest pressure, diarrhea, constipation, dysphagia, dizziness, leg swelling, leg pain, itchy eyes, itchy ears, heartburn, reflux, or sour taste in the mouth.

## 2022-04-11 NOTE — REASON FOR VISIT
[Follow-Up] : a follow-up visit [Spouse] : spouse [TextBox_44] : SOB, Asthma, Allergies, GERD, s/p COVID-19 1/2021, (+)SAMY

## 2022-04-11 NOTE — PROCEDURE
[FreeTextEntry1] : PFT revealed normal flows, with a FEV1 of 2.65L,which is 81% of predicted with a normal flow volume loop \par \par FENO was 10 ; a normal value being less than 25\par Fractional exhaled nitric oxide (FENO) is regarded as a simple, noninvasive method for assessing eosinophilic airway inflammation. Produced by a variety of cells within the lung, nitric oxide (NO) concentrations are generally low in healthy individuals. However, high concentrations of NO appear to be involved in nonspecific host defense mechanisms and chronic inflammatory diseases such as asthma. The American Thoracic Society (ATS) therefore has recommended using FENO to aid in the diagnosis and monitoring of eosinophilic airway inflammation and asthma, and for identifying steroid responsive individuals whose chronic respiratory symptoms may be caused by airway inflammation.

## 2022-04-11 NOTE — PHYSICAL EXAM
[No Acute Distress] : no acute distress [Normal Oropharynx] : normal oropharynx [III] : Mallampati Class: III [Normal Appearance] : normal appearance [No Neck Mass] : no neck mass [Normal Rate/Rhythm] : normal rate/rhythm [Normal S1, S2] : normal s1, s2 [No Murmurs] : no murmurs [No Resp Distress] : no resp distress [Clear to Auscultation Bilaterally] : clear to auscultation bilaterally [No Abnormalities] : no abnormalities [Benign] : benign [Normal Gait] : normal gait [No Clubbing] : no clubbing [No Cyanosis] : no cyanosis [No Edema] : no edema [FROM] : FROM [Normal Color/ Pigmentation] : normal color/ pigmentation [No Focal Deficits] : no focal deficits [Oriented x3] : oriented x3 [Normal Affect] : normal affect [Murmur ___ / 6] : murmur [unfilled] / 6 [TextBox_2] : OW [TextBox_54] : 2 [TextBox_68] : I:E ratio 1:3; clear

## 2022-04-11 NOTE — ADDENDUM
[FreeTextEntry1] : Documented by Mary Jhaveri acting as a scribe for Dr. Sly Estrada on 04/11/2022 \par \par All medical record entries made by the Scribe were at my, Dr. Sly Estrada's, direction and personally dictated by me on 04/11/2022 . I have reviewed the chart and agree that the record accurately reflects my personal performance of the history, physical exam, assessment and plan. I have also personally directed, reviewed, and agree with the discharge instructions

## 2022-07-06 ENCOUNTER — TRANSCRIPTION ENCOUNTER (OUTPATIENT)
Age: 47
End: 2022-07-06

## 2022-07-06 RX ORDER — FLUTICASONE FUROATE, UMECLIDINIUM BROMIDE AND VILANTEROL TRIFENATATE 200; 62.5; 25 UG/1; UG/1; UG/1
200-62.5-25 POWDER RESPIRATORY (INHALATION)
Qty: 3 | Refills: 1 | Status: ACTIVE | COMMUNITY
Start: 2021-02-09 | End: 1900-01-01

## 2022-07-06 RX ORDER — OLOPATADINE HYDROCHLORIDE 665 UG/1
0.6 SPRAY, METERED NASAL
Qty: 3 | Refills: 1 | Status: ACTIVE | COMMUNITY
Start: 2021-02-09 | End: 1900-01-01

## 2022-08-25 ENCOUNTER — RX RENEWAL (OUTPATIENT)
Age: 47
End: 2022-08-25

## 2022-08-25 RX ORDER — ALBUTEROL SULFATE 90 UG/1
108 (90 BASE) INHALANT RESPIRATORY (INHALATION)
Qty: 3 | Refills: 2 | Status: ACTIVE | COMMUNITY
Start: 2021-12-13 | End: 1900-01-01

## 2022-09-28 ENCOUNTER — APPOINTMENT (OUTPATIENT)
Dept: PULMONOLOGY | Facility: CLINIC | Age: 47
End: 2022-09-28
